# Patient Record
Sex: MALE | Race: OTHER | HISPANIC OR LATINO | URBAN - METROPOLITAN AREA
[De-identification: names, ages, dates, MRNs, and addresses within clinical notes are randomized per-mention and may not be internally consistent; named-entity substitution may affect disease eponyms.]

---

## 2017-05-24 ENCOUNTER — OUTPATIENT (OUTPATIENT)
Dept: OUTPATIENT SERVICES | Facility: HOSPITAL | Age: 37
LOS: 1 days | Discharge: HOME | End: 2017-05-24

## 2017-06-28 DIAGNOSIS — K05.4 PERIODONTOSIS: ICD-10-CM

## 2017-08-17 ENCOUNTER — OUTPATIENT (OUTPATIENT)
Dept: OUTPATIENT SERVICES | Facility: HOSPITAL | Age: 37
LOS: 1 days | Discharge: HOME | End: 2017-08-17

## 2017-08-17 ENCOUNTER — EMERGENCY (EMERGENCY)
Facility: HOSPITAL | Age: 37
LOS: 0 days | Discharge: HOME | End: 2017-08-17

## 2017-08-17 DIAGNOSIS — M79.606 PAIN IN LEG, UNSPECIFIED: ICD-10-CM

## 2017-08-17 DIAGNOSIS — M79.1 MYALGIA: ICD-10-CM

## 2017-08-17 DIAGNOSIS — E11.65 TYPE 2 DIABETES MELLITUS WITH HYPERGLYCEMIA: ICD-10-CM

## 2017-08-23 PROBLEM — Z00.00 ENCOUNTER FOR PREVENTIVE HEALTH EXAMINATION: Status: ACTIVE | Noted: 2017-08-23

## 2017-09-05 ENCOUNTER — OUTPATIENT (OUTPATIENT)
Dept: OUTPATIENT SERVICES | Facility: HOSPITAL | Age: 37
LOS: 1 days | Discharge: HOME | End: 2017-09-05

## 2017-09-14 ENCOUNTER — OUTPATIENT (OUTPATIENT)
Dept: OUTPATIENT SERVICES | Facility: HOSPITAL | Age: 37
LOS: 1 days | Discharge: HOME | End: 2017-09-14

## 2017-09-14 ENCOUNTER — RESULT REVIEW (OUTPATIENT)
Age: 37
End: 2017-09-14

## 2017-09-14 ENCOUNTER — APPOINTMENT (OUTPATIENT)
Dept: ENDOCRINOLOGY | Facility: CLINIC | Age: 37
End: 2017-09-14

## 2017-09-14 VITALS
BODY MASS INDEX: 23.56 KG/M2 | TEMPERATURE: 96.7 F | DIASTOLIC BLOOD PRESSURE: 80 MMHG | SYSTOLIC BLOOD PRESSURE: 115 MMHG | HEART RATE: 81 BPM | HEIGHT: 60 IN | WEIGHT: 120 LBS

## 2017-09-14 DIAGNOSIS — Z83.3 FAMILY HISTORY OF DIABETES MELLITUS: ICD-10-CM

## 2017-09-17 LAB — GLUCOSE SERPL-MCNC: 237 MG/DL

## 2017-09-17 RX ORDER — METFORMIN HYDROCHLORIDE 500 MG/1
500 TABLET, COATED ORAL
Qty: 180 | Refills: 2 | Status: DISCONTINUED | COMMUNITY
Start: 2017-09-14 | End: 2017-09-17

## 2017-11-03 ENCOUNTER — OUTPATIENT (OUTPATIENT)
Dept: OUTPATIENT SERVICES | Facility: HOSPITAL | Age: 37
LOS: 1 days | Discharge: HOME | End: 2017-11-03

## 2017-11-03 DIAGNOSIS — K02.52 DENTAL CARIES ON PIT AND FISSURE SURFACE PENETRATING INTO DENTIN: ICD-10-CM

## 2018-01-05 ENCOUNTER — OUTPATIENT (OUTPATIENT)
Dept: OUTPATIENT SERVICES | Facility: HOSPITAL | Age: 38
LOS: 1 days | Discharge: HOME | End: 2018-01-05

## 2018-01-05 DIAGNOSIS — K02.52 DENTAL CARIES ON PIT AND FISSURE SURFACE PENETRATING INTO DENTIN: ICD-10-CM

## 2018-01-25 ENCOUNTER — OUTPATIENT (OUTPATIENT)
Dept: OUTPATIENT SERVICES | Facility: HOSPITAL | Age: 38
LOS: 1 days | Discharge: HOME | End: 2018-01-25

## 2018-01-25 DIAGNOSIS — K02.52 DENTAL CARIES ON PIT AND FISSURE SURFACE PENETRATING INTO DENTIN: ICD-10-CM

## 2018-02-08 ENCOUNTER — OUTPATIENT (OUTPATIENT)
Dept: OUTPATIENT SERVICES | Facility: HOSPITAL | Age: 38
LOS: 1 days | Discharge: HOME | End: 2018-02-08

## 2018-02-09 DIAGNOSIS — K02.52 DENTAL CARIES ON PIT AND FISSURE SURFACE PENETRATING INTO DENTIN: ICD-10-CM

## 2018-02-22 ENCOUNTER — OUTPATIENT (OUTPATIENT)
Dept: OUTPATIENT SERVICES | Facility: HOSPITAL | Age: 38
LOS: 1 days | Discharge: HOME | End: 2018-02-22

## 2018-04-28 ENCOUNTER — EMERGENCY (EMERGENCY)
Facility: HOSPITAL | Age: 38
LOS: 0 days | Discharge: HOME | End: 2018-04-28
Attending: EMERGENCY MEDICINE | Admitting: EMERGENCY MEDICINE

## 2018-04-28 VITALS
HEART RATE: 90 BPM | DIASTOLIC BLOOD PRESSURE: 62 MMHG | TEMPERATURE: 99 F | RESPIRATION RATE: 18 BRPM | SYSTOLIC BLOOD PRESSURE: 111 MMHG

## 2018-04-28 DIAGNOSIS — K04.7 PERIAPICAL ABSCESS WITHOUT SINUS: ICD-10-CM

## 2018-04-28 DIAGNOSIS — R22.0 LOCALIZED SWELLING, MASS AND LUMP, HEAD: ICD-10-CM

## 2018-04-28 DIAGNOSIS — K08.89 OTHER SPECIFIED DISORDERS OF TEETH AND SUPPORTING STRUCTURES: ICD-10-CM

## 2018-04-28 RX ORDER — IBUPROFEN 200 MG
600 TABLET ORAL ONCE
Qty: 0 | Refills: 0 | Status: DISCONTINUED | OUTPATIENT
Start: 2018-04-28 | End: 2018-04-28

## 2018-04-28 RX ORDER — PENICILLIN V POTASSIUM 250 MG
500 TABLET ORAL ONCE
Qty: 0 | Refills: 0 | Status: DISCONTINUED | OUTPATIENT
Start: 2018-04-28 | End: 2018-04-28

## 2018-04-28 NOTE — ED PROVIDER NOTE - PROGRESS NOTE DETAILS
Case discussed with Dr Michael from dental will see patient patient seen by dental started on clindamycin and follow up as per appointment on Tuesday.  PT INSTRUCTED TO RETURN TO THE ED if swelling becomes worse of new sx develop including fever for reeval by dental and possible drainage of abscess for which dental sts too small for drainage at this time

## 2018-04-28 NOTE — ED PROVIDER NOTE - MEDICAL DECISION MAKING DETAILS
patient with swelling to left side of face seen by dental has an appointment with dental clinic on Tuesday, close follow up discussed

## 2018-04-28 NOTE — ED ADULT TRIAGE NOTE - CHIEF COMPLAINT QUOTE
left sided facial swelling x 3 days. Pt taking Advil with no relief. Pt denies any difficulty swallowing, speech clear, bl hand  equal and firm

## 2018-04-28 NOTE — ED PROVIDER NOTE - NS ED ROS FT
Review of Systems  Constitutional: (-) fever  Eyes/ENT: (-) blurry vision  Cardiovascular: (-) chest pain  Respiratory: (-) cough  Gastrointestinal: (-) vomiting, (-) diarrhea  Musculoskeletal: (-) neck pain  Integumentary: +facial redness  Neurological: (-) headache, (-) altered mental status

## 2018-04-28 NOTE — ED PROVIDER NOTE - PHYSICAL EXAMINATION
Gen: Alert, NAD, well appearing  Head: NC, AT, PERRL, EOMI  ENT: normal hearing, +poor dentition with small absecss between gingival line and buccal mucosa with tenderness to area. +mild facial swelling on left no crepitus  Neck: +supple, no tenderness, +submandibular LAD on left  Pulm: Bilateral BS, normal resp effort, no wheeze/stridor/retractions  CV: RRR, no M/R/G  Abd: soft, NT/ND  Mskel: no edema  Neuro: AAOx3

## 2018-04-28 NOTE — ED PROVIDER NOTE - ATTENDING CONTRIBUTION TO CARE
Left facial swelling. Pt has been having root canal done recently at the dental clinic. Work incomplete. Now pain and swelling. Consulted dental advised only clinda and follow up this week for dental eval.. ON exam no evidence of ludwigs. mild left facial swelling. + gingival swelling.

## 2018-04-28 NOTE — ED PROVIDER NOTE - OBJECTIVE STATEMENT
Patient pmhx DM presents to the ED for evaluation of facial swelling to left side.  As per patient was in dental clinic on Tuesday for root canal and noted swelling to left side of face today, able to swallow no fever no trismus, minimally tender no pain with eye movement.

## 2018-05-30 ENCOUNTER — APPOINTMENT (OUTPATIENT)
Dept: ENDOCRINOLOGY | Facility: CLINIC | Age: 38
End: 2018-05-30

## 2018-05-30 ENCOUNTER — LABORATORY RESULT (OUTPATIENT)
Age: 38
End: 2018-05-30

## 2018-05-30 ENCOUNTER — OUTPATIENT (OUTPATIENT)
Dept: OUTPATIENT SERVICES | Facility: HOSPITAL | Age: 38
LOS: 1 days | Discharge: HOME | End: 2018-05-30

## 2018-05-30 VITALS — SYSTOLIC BLOOD PRESSURE: 138 MMHG | DIASTOLIC BLOOD PRESSURE: 85 MMHG | HEART RATE: 87 BPM

## 2018-05-30 VITALS — WEIGHT: 114 LBS | BODY MASS INDEX: 339.97 KG/M2

## 2018-05-30 LAB — GLUCOSE BLDC GLUCOMTR-MCNC: 333

## 2018-05-30 RX ORDER — METFORMIN HYDROCHLORIDE 1000 MG/1
1000 TABLET, COATED ORAL TWICE DAILY
Qty: 180 | Refills: 3 | Status: ACTIVE | COMMUNITY
Start: 2017-09-14 | End: 1900-01-01

## 2018-05-31 ENCOUNTER — OTHER (OUTPATIENT)
Age: 38
End: 2018-05-31

## 2018-05-31 LAB
ALBUMIN SERPL ELPH-MCNC: 4.7 G/DL
ALP BLD-CCNC: 142 U/L
ALT SERPL-CCNC: 30 U/L
ANION GAP SERPL CALC-SCNC: 15 MMOL/L
AST SERPL-CCNC: 26 U/L
BILIRUB SERPL-MCNC: 0.6 MG/DL
BUN SERPL-MCNC: 17 MG/DL
CALCIUM SERPL-MCNC: 9.3 MG/DL
CHLORIDE SERPL-SCNC: 98 MMOL/L
CHOLEST SERPL-MCNC: 235 MG/DL
CHOLEST/HDLC SERPL: 3.2 RATIO
CO2 SERPL-SCNC: 27 MMOL/L
CREAT SERPL-MCNC: 0.8 MG/DL
ESTIMATED AVERAGE GLUCOSE: 398 MG/DL
GLUCOSE SERPL-MCNC: 337 MG/DL
HBA1C MFR BLD HPLC: 15.5 %
HDLC SERPL-MCNC: 74 MG/DL
LDLC SERPL CALC-MCNC: 160 MG/DL
POTASSIUM SERPL-SCNC: 4.7 MMOL/L
PROT SERPL-MCNC: 7.2 G/DL
SODIUM SERPL-SCNC: 140 MMOL/L
TRIGL SERPL-MCNC: 97 MG/DL

## 2018-06-01 LAB
CREAT SPEC-SCNC: 32 MG/DL
MICROALBUMIN 24H UR DL<=1MG/L-MCNC: 14.2 MG/DL
MICROALBUMIN/CREAT 24H UR-RTO: 444 MG/G

## 2018-08-02 ENCOUNTER — OUTPATIENT (OUTPATIENT)
Dept: OUTPATIENT SERVICES | Facility: HOSPITAL | Age: 38
LOS: 1 days | Discharge: HOME | End: 2018-08-02

## 2018-10-04 ENCOUNTER — OUTPATIENT (OUTPATIENT)
Facility: HOSPITAL | Age: 38
LOS: 1 days | Discharge: HOME | End: 2018-10-04

## 2018-10-04 DIAGNOSIS — K02.53 DENTAL CARIES ON PIT AND FISSURE SURFACE PENETRATING INTO PULP: ICD-10-CM

## 2018-10-18 ENCOUNTER — OUTPATIENT (OUTPATIENT)
Dept: OUTPATIENT SERVICES | Facility: HOSPITAL | Age: 38
LOS: 1 days | Discharge: HOME | End: 2018-10-18

## 2018-10-18 DIAGNOSIS — K02.53 DENTAL CARIES ON PIT AND FISSURE SURFACE PENETRATING INTO PULP: ICD-10-CM

## 2018-12-06 ENCOUNTER — OUTPATIENT (OUTPATIENT)
Dept: OUTPATIENT SERVICES | Facility: HOSPITAL | Age: 38
LOS: 1 days | Discharge: HOME | End: 2018-12-06

## 2018-12-10 ENCOUNTER — EMERGENCY (EMERGENCY)
Facility: HOSPITAL | Age: 38
LOS: 1 days | Discharge: HOME | End: 2018-12-10
Attending: EMERGENCY MEDICINE

## 2018-12-10 VITALS
OXYGEN SATURATION: 99 % | HEART RATE: 82 BPM | SYSTOLIC BLOOD PRESSURE: 100 MMHG | WEIGHT: 169.98 LBS | TEMPERATURE: 97 F | HEIGHT: 67 IN | DIASTOLIC BLOOD PRESSURE: 68 MMHG | RESPIRATION RATE: 18 BRPM

## 2018-12-10 VITALS
SYSTOLIC BLOOD PRESSURE: 87 MMHG | OXYGEN SATURATION: 99 % | HEART RATE: 89 BPM | DIASTOLIC BLOOD PRESSURE: 53 MMHG | RESPIRATION RATE: 17 BRPM | TEMPERATURE: 97 F

## 2018-12-10 DIAGNOSIS — Z79.2 LONG TERM (CURRENT) USE OF ANTIBIOTICS: ICD-10-CM

## 2018-12-10 DIAGNOSIS — M79.10 MYALGIA, UNSPECIFIED SITE: ICD-10-CM

## 2018-12-10 DIAGNOSIS — J06.9 ACUTE UPPER RESPIRATORY INFECTION, UNSPECIFIED: ICD-10-CM

## 2018-12-10 DIAGNOSIS — E11.9 TYPE 2 DIABETES MELLITUS WITHOUT COMPLICATIONS: ICD-10-CM

## 2018-12-10 DIAGNOSIS — J02.9 ACUTE PHARYNGITIS, UNSPECIFIED: ICD-10-CM

## 2018-12-10 RX ORDER — DEXAMETHASONE 0.5 MG/5ML
10 ELIXIR ORAL ONCE
Qty: 0 | Refills: 0 | Status: DISCONTINUED | OUTPATIENT
Start: 2018-12-10 | End: 2018-12-10

## 2018-12-10 RX ORDER — IBUPROFEN 200 MG
2 TABLET ORAL
Qty: 84 | Refills: 0 | OUTPATIENT
Start: 2018-12-10 | End: 2018-12-23

## 2018-12-10 NOTE — ED PROVIDER NOTE - ATTENDING CONTRIBUTION TO CARE
Pt is a 39yo male with sore throat and mylagias for 3days.  No fever.    Exam: normal pharynx, no LAD, CTAB, NAD  Imp: viral URI  Plan: [place] home

## 2018-12-10 NOTE — ED PROVIDER NOTE - OBJECTIVE STATEMENT
38M with pmh of DM presents with sore throat for the past 3 days. Denies fever, chills, confirms body aches and congestion. Denies sick contacts. No n/v/d, SOB or CP. Denies cough.

## 2018-12-10 NOTE — ED PROVIDER NOTE - NSFOLLOWUPINSTRUCTIONS_ED_ALL_ED_FT
Pharyngitis    Pharyngitis is inflammation of your pharynx, which is typically caused by a viral or bacterial infection. Pharyngitis can be contagious and may spread from person to person through intimate contact, coughing, sneezing, or sharing personal items and utensils. Symptoms of pharyngitis may include sore throat, fever, headache, or swollen lymph nodes. If you are prescribed antibiotics, make sure you finish them even if you start to feel better. Gargle with salt water as often as every 1-2 hours to soothe your throat. Throat lozenges (if you are not at risk for choking) or sprays may be used to soothe your throat.    SEEK IMMEDIATE MEDICAL CARE IF YOU HAVE ANY OF THE FOLLOWING SYMPTOMS: neck stiffness, drooling, hoarseness or change in voice, inability to swallow liquids, vomiting, or trouble breathing.

## 2018-12-10 NOTE — ED PROVIDER NOTE - NS ED ROS FT
Eyes:  No visual changes, eye pain or discharge.  ENMT:  No hearing changes, pain, + sore throat - runny nose, no difficulty swallowing  Cardiac:  No chest pain, SOB or edema. No chest pain with exertion.  Respiratory:  No cough or respiratory distress. No hemoptysis. No history of asthma or RAD.  GI:  No nausea, vomiting, diarrhea or abdominal pain.  :  No dysuria, frequency or burning.  MS:  No myalgia, muscle weakness, joint pain or back pain.  Neuro:  No headache or weakness.  No LOC.  Skin:  No skin rash.   Endocrine: No history of thyroid disease + diabetes.

## 2018-12-10 NOTE — ED PROVIDER NOTE - PHYSICAL EXAMINATION
CONSTITUTIONAL: Well-developed; well-nourished; in no acute distress.   SKIN: warm, dry  HEAD: Normocephalic; atraumatic.  EYES: PERRL, EOMI, no conjunctival erythema  ENT: No nasal discharge; airway clear. +pharyngeal erythema, no exudates  NECK: Supple; non tender. No lymphadenopathy.  CARD: S1, S2 normal; no murmurs, gallops, or rubs. Regular rate and rhythm.   RESP: No wheezes, rales or rhonchi.  ABD: soft ntnd  EXT: Normal ROM.  No clubbing, cyanosis or edema.   LYMPH: No acute cervical adenopathy.  NEURO: Alert, oriented, grossly unremarkable  PSYCH: Cooperative, appropriate.

## 2018-12-15 ENCOUNTER — EMERGENCY (EMERGENCY)
Facility: HOSPITAL | Age: 38
LOS: 0 days | Discharge: HOME | End: 2018-12-15
Admitting: EMERGENCY MEDICAL TECHNICIAN, BASIC

## 2018-12-15 VITALS
DIASTOLIC BLOOD PRESSURE: 65 MMHG | OXYGEN SATURATION: 99 % | HEART RATE: 94 BPM | RESPIRATION RATE: 18 BRPM | SYSTOLIC BLOOD PRESSURE: 102 MMHG | TEMPERATURE: 96 F

## 2018-12-15 DIAGNOSIS — H66.91 OTITIS MEDIA, UNSPECIFIED, RIGHT EAR: ICD-10-CM

## 2018-12-15 DIAGNOSIS — H92.01 OTALGIA, RIGHT EAR: ICD-10-CM

## 2018-12-15 PROBLEM — E11.9 TYPE 2 DIABETES MELLITUS WITHOUT COMPLICATIONS: Chronic | Status: ACTIVE | Noted: 2018-12-10

## 2018-12-15 LAB
BASE EXCESS BLDV CALC-SCNC: -1.5 MMOL/L — SIGNIFICANT CHANGE UP (ref -2–2)
CA-I SERPL-SCNC: 1.22 MMOL/L — SIGNIFICANT CHANGE UP (ref 1.12–1.3)
GAS PNL BLDV: 135 MMOL/L — LOW (ref 136–145)
GAS PNL BLDV: SIGNIFICANT CHANGE UP
GAS PNL BLDV: SIGNIFICANT CHANGE UP
HCO3 BLDV-SCNC: 25 MMOL/L — SIGNIFICANT CHANGE UP (ref 22–29)
HCT VFR BLDA CALC: 54 % — HIGH (ref 34–44)
HGB BLD CALC-MCNC: 18 G/DL — SIGNIFICANT CHANGE UP (ref 14–18)
LACTATE BLDV-MCNC: 1.2 MMOL/L — SIGNIFICANT CHANGE UP (ref 0.5–1.6)
PCO2 BLDV: 49 MMHG — SIGNIFICANT CHANGE UP (ref 41–51)
PH BLDV: 7.32 — SIGNIFICANT CHANGE UP (ref 7.26–7.43)
PO2 BLDV: 22 MMHG — SIGNIFICANT CHANGE UP (ref 20–40)
POTASSIUM BLDV-SCNC: 4.5 MMOL/L — SIGNIFICANT CHANGE UP (ref 3.3–5.6)
SAO2 % BLDV: 35 % — SIGNIFICANT CHANGE UP

## 2018-12-15 RX ORDER — SODIUM CHLORIDE 9 MG/ML
2000 INJECTION INTRAMUSCULAR; INTRAVENOUS; SUBCUTANEOUS ONCE
Qty: 0 | Refills: 0 | Status: COMPLETED | OUTPATIENT
Start: 2018-12-15 | End: 2018-12-15

## 2018-12-15 RX ORDER — KETOROLAC TROMETHAMINE 30 MG/ML
30 SYRINGE (ML) INJECTION ONCE
Qty: 0 | Refills: 0 | Status: DISCONTINUED | OUTPATIENT
Start: 2018-12-15 | End: 2018-12-15

## 2018-12-15 RX ADMIN — Medication 30 MILLIGRAM(S): at 17:35

## 2018-12-15 RX ADMIN — SODIUM CHLORIDE 1000 MILLILITER(S): 9 INJECTION INTRAMUSCULAR; INTRAVENOUS; SUBCUTANEOUS at 17:34

## 2018-12-15 NOTE — ED PROVIDER NOTE - CARE PROVIDER_API CALL
Marlene Roland), Otolaryngology  [address] [place] [address]  Phone: [phone]  Fax: [phone]

## 2018-12-15 NOTE — ED PROVIDER NOTE - MEDICAL DECISION MAKING DETAILS
pt feel smuch better after fluids and toradol, will d/c home with script for augmentin for otitis media pt feel smuch better after fluids and toradol, will d/c home with script for augmentin for otitis media, [place]  used #[id number]

## 2018-12-15 NOTE — ED PROVIDER NOTE - OBJECTIVE STATEMENT
Pt is a 37y/o male with a pmhx of DM Presents today for eval of right ear pain, sore throat and dry cough x 6 days. Pt sts cough and sore throat have nearly resolved, but ear pain has gotten worse today. Pt denies fever, chills, weakness, numbness, n/v/d, CP, SOB.

## 2018-12-15 NOTE — ED PROVIDER NOTE - NS ED ROS FT
ENMT: + sore throat  + ear pain, No hearing changes, pain, discharge or infections. No neck pain or stiffness.  Cardiac:  No chest pain, SOB or edema. No chest pain with exertion.  Respiratory:  + cough No respiratory distress. No hemoptysis. No history of asthma or RAD.  GI:  No nausea, vomiting, diarrhea or abdominal pain.  MS:  No myalgia, muscle weakness, joint pain or back pain.  Neuro:  No headache or weakness.  No LOC.  Skin:  No skin rash.   Endocrine: + diabetes.  Except as documented in the HPI,  all other systems are negative.

## 2018-12-15 NOTE — ED PROVIDER NOTE - NSFOLLOWUPCLINICS_GEN_ALL_ED_FT
[facility]  ENT  [address], [address]  [place] [address]  Phone: [phone]  Fax:   Follow Up Time:

## 2018-12-15 NOTE — ED PROVIDER NOTE - PHYSICAL EXAMINATION
VITAL SIGNS: I have reviewed nursing notes and confirm.  CONSTITUTIONAL: Well-developed; well-nourished; in no acute distress.   SKIN:  skin exam is warm and dry, no acute rash.    HEAD: Normocephalic; atraumatic.  EYES: conjunctiva and sclera clear.  ENT: uvula midline, no exudates, no sublingual edema, erythema to right ear canal, mild effusion, no pain to ear with manipulation of pinna, no mastoid tenderness, No nasal discharge; airway clear.  CARD: S1, S2 normal; no murmurs, gallops, or rubs. Regular rate and rhythm.   RESP: No wheezes, rales or rhonchi.  ABD: Normal bowel sounds; soft; non-distended; non-tender  EXT: Normal ROM.  No clubbing, cyanosis or edema.   NEURO: Alert, oriented, grossly unremarkable

## 2018-12-25 ENCOUNTER — EMERGENCY (EMERGENCY)
Facility: HOSPITAL | Age: 38
LOS: 0 days | Discharge: HOME | End: 2018-12-25
Admitting: PHYSICIAN ASSISTANT

## 2018-12-25 VITALS
DIASTOLIC BLOOD PRESSURE: 65 MMHG | SYSTOLIC BLOOD PRESSURE: 109 MMHG | OXYGEN SATURATION: 100 % | HEART RATE: 94 BPM | TEMPERATURE: 96 F | RESPIRATION RATE: 18 BRPM

## 2018-12-25 DIAGNOSIS — H92.03 OTALGIA, BILATERAL: ICD-10-CM

## 2018-12-25 DIAGNOSIS — E11.9 TYPE 2 DIABETES MELLITUS WITHOUT COMPLICATIONS: ICD-10-CM

## 2018-12-25 DIAGNOSIS — Z79.2 LONG TERM (CURRENT) USE OF ANTIBIOTICS: ICD-10-CM

## 2018-12-25 DIAGNOSIS — H92.09 OTALGIA, UNSPECIFIED EAR: ICD-10-CM

## 2018-12-25 DIAGNOSIS — Z79.899 OTHER LONG TERM (CURRENT) DRUG THERAPY: ICD-10-CM

## 2018-12-25 NOTE — ED PROVIDER NOTE - OBJECTIVE STATEMENT
38 y.o. male with PMH of diabetes presents to the ED c/o ear pain x few days.  Pt has been seen in the ED for similar complaints and was prescribed Augmentin.  Pt tried to follow up with ENT but was not able to schedule appointment.  Pt denies any fever, chills, n/v/d, drainage, bleeding, discharge, or any other complaints.

## 2018-12-25 NOTE — ED PROVIDER NOTE - PHYSICAL EXAMINATION
CONSTITUTIONAL: Well-developed; well-nourished; in no acute distress, nontoxic appearing  SKIN: skin exam is warm and dry,  HEAD: Normocephalic; atraumatic.  EYES: PERRL, 3 mm bilateral, no nystagmus, EOM intact; conjunctiva and sclera clear.  ENT: MMM, no nasal congestion, no erythema, no TM bulging, no discharge  NECK: Supple; non tender.+ full passive ROM in all directions. No JVD  CARD: S1, S2 normal, no murmur  RESP: No wheezes, rales or rhonchi. Good air movement bilaterally  ABD: soft; non-distended; non-tender. No Rebound, No guarding  EXT: Normal ROM. No cyanosis or edema. Dp Pulses intact.   NEURO: awake, alert, following commands, oriented, grossly unremarkable. No Focal deficits. GCS 15.   PSYCH: Cooperative, appropriate.

## 2018-12-25 NOTE — ED PROVIDER NOTE - NSFOLLOWUPCLINICS_GEN_ALL_ED_FT
[facility]  ENT  [address], [address]  [place] [address]  Phone: [phone]  Fax:   Follow Up Time:

## 2018-12-25 NOTE — ED PROVIDER NOTE - NS ED ROS FT
Constitutional:  no fevers, no chills, no malaise  Eyes:  No visual changes  ENMT: +ear pain; No neck pain or stiffness, no nasal congestion, no throat pain  Cardiac:  No chest pain  Respiratory:  No cough or sob  GI:  No nausea, vomiting, diarrhea or abdominal pain.  :  No dysuria, frequency or burning.  MS:  No back pain, no joint pain.  Neuro:  No headache, no dizziness, no change in mental status  Skin:  No skin rash  Except as documented in the HPI,  all other systems are negative

## 2018-12-25 NOTE — ED PROVIDER NOTE - CARE PROVIDER_API CALL
Ambrosio Yates), Otolaryngology  [address] [place] [address]  Phone: [phone]  Fax: [phone]

## 2019-01-02 ENCOUNTER — APPOINTMENT (OUTPATIENT)
Dept: OTOLARYNGOLOGY | Facility: CLINIC | Age: 39
End: 2019-01-02
Payer: MEDICAID

## 2019-01-02 VITALS — BODY MASS INDEX: 18.19 KG/M2 | HEIGHT: 68 IN | WEIGHT: 120 LBS

## 2019-01-02 DIAGNOSIS — E11.8 TYPE 2 DIABETES MELLITUS WITH UNSPECIFIED COMPLICATIONS: ICD-10-CM

## 2019-01-02 DIAGNOSIS — H92.01 OTALGIA, RIGHT EAR: ICD-10-CM

## 2019-01-02 DIAGNOSIS — H90.5 UNSPECIFIED SENSORINEURAL HEARING LOSS: ICD-10-CM

## 2019-01-02 DIAGNOSIS — F17.200 NICOTINE DEPENDENCE, UNSPECIFIED, UNCOMPLICATED: ICD-10-CM

## 2019-01-02 DIAGNOSIS — Z87.09 PERSONAL HISTORY OF OTHER DISEASES OF THE RESPIRATORY SYSTEM: ICD-10-CM

## 2019-01-02 PROCEDURE — 99204 OFFICE O/P NEW MOD 45 MIN: CPT | Mod: 25

## 2019-01-02 PROCEDURE — 92550 TYMPANOMETRY & REFLEX THRESH: CPT

## 2019-01-02 PROCEDURE — 92557 COMPREHENSIVE HEARING TEST: CPT

## 2019-01-02 PROCEDURE — 31575 DIAGNOSTIC LARYNGOSCOPY: CPT

## 2019-01-03 ENCOUNTER — OUTPATIENT (OUTPATIENT)
Dept: OUTPATIENT SERVICES | Facility: HOSPITAL | Age: 39
LOS: 1 days | Discharge: HOME | End: 2019-01-03

## 2019-01-03 DIAGNOSIS — K02.53 DENTAL CARIES ON PIT AND FISSURE SURFACE PENETRATING INTO PULP: ICD-10-CM

## 2019-01-08 ENCOUNTER — INPATIENT (INPATIENT)
Facility: HOSPITAL | Age: 39
LOS: 1 days | Discharge: HOME | End: 2019-01-10
Attending: INTERNAL MEDICINE | Admitting: INTERNAL MEDICINE

## 2019-01-08 VITALS
DIASTOLIC BLOOD PRESSURE: 70 MMHG | SYSTOLIC BLOOD PRESSURE: 107 MMHG | OXYGEN SATURATION: 100 % | HEART RATE: 88 BPM | RESPIRATION RATE: 18 BRPM | TEMPERATURE: 96 F

## 2019-01-08 LAB
ALBUMIN SERPL ELPH-MCNC: 3.7 G/DL — SIGNIFICANT CHANGE UP (ref 3.5–5.2)
ALP SERPL-CCNC: 93 U/L — SIGNIFICANT CHANGE UP (ref 30–115)
ALT FLD-CCNC: 13 U/L — SIGNIFICANT CHANGE UP (ref 0–41)
ANION GAP SERPL CALC-SCNC: 24 MMOL/L — HIGH (ref 7–14)
ANION GAP SERPL CALC-SCNC: 27 MMOL/L — HIGH (ref 7–14)
APPEARANCE UR: CLEAR — SIGNIFICANT CHANGE UP
AST SERPL-CCNC: 8 U/L — SIGNIFICANT CHANGE UP (ref 0–41)
BASE EXCESS BLDV CALC-SCNC: -5.1 MMOL/L — LOW (ref -2–2)
BASOPHILS # BLD AUTO: 0.08 K/UL — SIGNIFICANT CHANGE UP (ref 0–0.2)
BASOPHILS NFR BLD AUTO: 1.3 % — HIGH (ref 0–1)
BILIRUB SERPL-MCNC: 0.4 MG/DL — SIGNIFICANT CHANGE UP (ref 0.2–1.2)
BILIRUB UR-MCNC: NEGATIVE — SIGNIFICANT CHANGE UP
BLD GP AB SCN SERPL QL: SIGNIFICANT CHANGE UP
BUN SERPL-MCNC: 22 MG/DL — HIGH (ref 10–20)
BUN SERPL-MCNC: 29 MG/DL — HIGH (ref 10–20)
CA-I SERPL-SCNC: 1.25 MMOL/L — SIGNIFICANT CHANGE UP (ref 1.12–1.3)
CALCIUM SERPL-MCNC: 8.5 MG/DL — SIGNIFICANT CHANGE UP (ref 8.5–10.1)
CALCIUM SERPL-MCNC: 9.5 MG/DL — SIGNIFICANT CHANGE UP (ref 8.5–10.1)
CHLORIDE SERPL-SCNC: 87 MMOL/L — LOW (ref 98–110)
CHLORIDE SERPL-SCNC: 91 MMOL/L — LOW (ref 98–110)
CO2 SERPL-SCNC: 18 MMOL/L — SIGNIFICANT CHANGE UP (ref 17–32)
CO2 SERPL-SCNC: 20 MMOL/L — SIGNIFICANT CHANGE UP (ref 17–32)
COLOR SPEC: YELLOW — SIGNIFICANT CHANGE UP
CREAT SERPL-MCNC: 0.8 MG/DL — SIGNIFICANT CHANGE UP (ref 0.7–1.5)
CREAT SERPL-MCNC: 1 MG/DL — SIGNIFICANT CHANGE UP (ref 0.7–1.5)
DIFF PNL FLD: NEGATIVE — SIGNIFICANT CHANGE UP
EOSINOPHIL # BLD AUTO: 0.07 K/UL — SIGNIFICANT CHANGE UP (ref 0–0.7)
EOSINOPHIL NFR BLD AUTO: 1.1 % — SIGNIFICANT CHANGE UP (ref 0–8)
GAS PNL BLDV: 134 MMOL/L — LOW (ref 136–145)
GAS PNL BLDV: SIGNIFICANT CHANGE UP
GLUCOSE BLDC GLUCOMTR-MCNC: 280 MG/DL — HIGH (ref 70–99)
GLUCOSE SERPL-MCNC: 341 MG/DL — HIGH (ref 70–99)
GLUCOSE SERPL-MCNC: 407 MG/DL — HIGH (ref 70–99)
GLUCOSE UR QL: >=1000
HCO3 BLDV-SCNC: 20 MMOL/L — LOW (ref 22–29)
HCT VFR BLD CALC: 37.4 % — LOW (ref 42–52)
HCT VFR BLDA CALC: 40.3 % — SIGNIFICANT CHANGE UP (ref 34–44)
HGB BLD CALC-MCNC: 13.1 G/DL — LOW (ref 14–18)
HGB BLD-MCNC: 12.6 G/DL — LOW (ref 14–18)
IMM GRANULOCYTES NFR BLD AUTO: 0.6 % — HIGH (ref 0.1–0.3)
KETONES UR-MCNC: >=80
LACTATE BLDV-MCNC: 1.3 MMOL/L — SIGNIFICANT CHANGE UP (ref 0.5–1.6)
LEUKOCYTE ESTERASE UR-ACNC: NEGATIVE — SIGNIFICANT CHANGE UP
LYMPHOCYTES # BLD AUTO: 1.94 K/UL — SIGNIFICANT CHANGE UP (ref 1.2–3.4)
LYMPHOCYTES # BLD AUTO: 30.6 % — SIGNIFICANT CHANGE UP (ref 20.5–51.1)
MAGNESIUM SERPL-MCNC: 1.6 MG/DL — LOW (ref 1.8–2.4)
MCHC RBC-ENTMCNC: 30.4 PG — SIGNIFICANT CHANGE UP (ref 27–31)
MCHC RBC-ENTMCNC: 33.7 G/DL — SIGNIFICANT CHANGE UP (ref 32–37)
MCV RBC AUTO: 90.1 FL — SIGNIFICANT CHANGE UP (ref 80–94)
MONOCYTES # BLD AUTO: 0.59 K/UL — SIGNIFICANT CHANGE UP (ref 0.1–0.6)
MONOCYTES NFR BLD AUTO: 9.3 % — SIGNIFICANT CHANGE UP (ref 1.7–9.3)
NEUTROPHILS # BLD AUTO: 3.63 K/UL — SIGNIFICANT CHANGE UP (ref 1.4–6.5)
NEUTROPHILS NFR BLD AUTO: 57.1 % — SIGNIFICANT CHANGE UP (ref 42.2–75.2)
NITRITE UR-MCNC: NEGATIVE — SIGNIFICANT CHANGE UP
NRBC # BLD: 0 /100 WBCS — SIGNIFICANT CHANGE UP (ref 0–0)
PCO2 BLDV: 39 MMHG — LOW (ref 41–51)
PH BLDV: 7.33 — SIGNIFICANT CHANGE UP (ref 7.26–7.43)
PH UR: 6 — SIGNIFICANT CHANGE UP (ref 5–8)
PHOSPHATE SERPL-MCNC: 3.6 MG/DL — SIGNIFICANT CHANGE UP (ref 2.1–4.9)
PLATELET # BLD AUTO: 310 K/UL — SIGNIFICANT CHANGE UP (ref 130–400)
PO2 BLDV: 37 MMHG — SIGNIFICANT CHANGE UP (ref 20–40)
POTASSIUM BLDV-SCNC: 5 MMOL/L — SIGNIFICANT CHANGE UP (ref 3.3–5.6)
POTASSIUM SERPL-MCNC: 4.5 MMOL/L — SIGNIFICANT CHANGE UP (ref 3.5–5)
POTASSIUM SERPL-MCNC: 5.5 MMOL/L — HIGH (ref 3.5–5)
POTASSIUM SERPL-SCNC: 4.5 MMOL/L — SIGNIFICANT CHANGE UP (ref 3.5–5)
POTASSIUM SERPL-SCNC: 5.5 MMOL/L — HIGH (ref 3.5–5)
PROT SERPL-MCNC: 6.4 G/DL — SIGNIFICANT CHANGE UP (ref 6–8)
PROT UR-MCNC: NEGATIVE — SIGNIFICANT CHANGE UP
RBC # BLD: 4.15 M/UL — LOW (ref 4.7–6.1)
RBC # FLD: 11.9 % — SIGNIFICANT CHANGE UP (ref 11.5–14.5)
SAO2 % BLDV: 65 % — SIGNIFICANT CHANGE UP
SODIUM SERPL-SCNC: 133 MMOL/L — LOW (ref 135–146)
SODIUM SERPL-SCNC: 134 MMOL/L — LOW (ref 135–146)
SP GR SPEC: >=1.03 — SIGNIFICANT CHANGE UP (ref 1.01–1.03)
TROPONIN T SERPL-MCNC: <0.01 NG/ML — SIGNIFICANT CHANGE UP
TYPE + AB SCN PNL BLD: SIGNIFICANT CHANGE UP
UROBILINOGEN FLD QL: 0.2 — SIGNIFICANT CHANGE UP (ref 0.2–0.2)
WBC # BLD: 6.35 K/UL — SIGNIFICANT CHANGE UP (ref 4.8–10.8)
WBC # FLD AUTO: 6.35 K/UL — SIGNIFICANT CHANGE UP (ref 4.8–10.8)

## 2019-01-08 RX ORDER — SODIUM CHLORIDE 9 MG/ML
1000 INJECTION, SOLUTION INTRAVENOUS ONCE
Qty: 0 | Refills: 0 | Status: DISCONTINUED | OUTPATIENT
Start: 2019-01-08 | End: 2019-01-08

## 2019-01-08 RX ORDER — SODIUM CHLORIDE 9 MG/ML
2000 INJECTION, SOLUTION INTRAVENOUS ONCE
Qty: 0 | Refills: 0 | Status: COMPLETED | OUTPATIENT
Start: 2019-01-08 | End: 2019-01-08

## 2019-01-08 RX ORDER — SODIUM CHLORIDE 9 MG/ML
1000 INJECTION, SOLUTION INTRAVENOUS
Qty: 0 | Refills: 0 | Status: DISCONTINUED | OUTPATIENT
Start: 2019-01-08 | End: 2019-01-09

## 2019-01-08 RX ORDER — INSULIN HUMAN 100 [IU]/ML
5 INJECTION, SOLUTION SUBCUTANEOUS
Qty: 100 | Refills: 0 | Status: DISCONTINUED | OUTPATIENT
Start: 2019-01-08 | End: 2019-01-09

## 2019-01-08 RX ORDER — SODIUM CHLORIDE 9 MG/ML
1000 INJECTION, SOLUTION INTRAVENOUS
Qty: 0 | Refills: 0 | Status: DISCONTINUED | OUTPATIENT
Start: 2019-01-08 | End: 2019-01-08

## 2019-01-08 RX ORDER — SODIUM CHLORIDE 9 MG/ML
1000 INJECTION INTRAMUSCULAR; INTRAVENOUS; SUBCUTANEOUS ONCE
Qty: 0 | Refills: 0 | Status: COMPLETED | OUTPATIENT
Start: 2019-01-08 | End: 2019-01-08

## 2019-01-08 RX ORDER — INSULIN HUMAN 100 [IU]/ML
8 INJECTION, SOLUTION SUBCUTANEOUS ONCE
Qty: 0 | Refills: 0 | Status: DISCONTINUED | OUTPATIENT
Start: 2019-01-08 | End: 2019-01-08

## 2019-01-08 RX ORDER — METOCLOPRAMIDE HCL 10 MG
10 TABLET ORAL ONCE
Qty: 0 | Refills: 0 | Status: COMPLETED | OUTPATIENT
Start: 2019-01-08 | End: 2019-01-08

## 2019-01-08 RX ADMIN — SODIUM CHLORIDE 200 MILLILITER(S): 9 INJECTION, SOLUTION INTRAVENOUS at 21:15

## 2019-01-08 RX ADMIN — INSULIN HUMAN 5 UNIT(S)/HR: 100 INJECTION, SOLUTION SUBCUTANEOUS at 21:15

## 2019-01-08 RX ADMIN — SODIUM CHLORIDE 1000 MILLILITER(S): 9 INJECTION INTRAMUSCULAR; INTRAVENOUS; SUBCUTANEOUS at 21:32

## 2019-01-08 RX ADMIN — SODIUM CHLORIDE 2000 MILLILITER(S): 9 INJECTION, SOLUTION INTRAVENOUS at 15:50

## 2019-01-08 RX ADMIN — SODIUM CHLORIDE 2000 MILLILITER(S): 9 INJECTION INTRAMUSCULAR; INTRAVENOUS; SUBCUTANEOUS at 17:13

## 2019-01-08 RX ADMIN — Medication 10 MILLIGRAM(S): at 16:00

## 2019-01-08 NOTE — ED PROVIDER NOTE - ATTENDING CONTRIBUTION TO CARE
39 yo m with pmh of DM presents with 3 days of dizziness, lightheadedness.  no cp, no sob, no abd pain, no back pain, no fevers, no chills.  no headache, no neck pain.  no trauma.  pt was seen buy ENT recently and given abx for ear infection.  pt feels lightheaded.    Physician who speaks [place] spoke to pt to translate.  awake, alert.  eomi.  motor/sensation intact.  abd soft, nontender.  Lungs clear.    p:  labs, ct, ivf, ekg, cxr, reassess.

## 2019-01-08 NOTE — ED PROVIDER NOTE - MEDICAL DECISION MAKING DETAILS
pt with DM here with malaise, lightheadedness, not feeling well.  no cp, no sob.  pt on abx for ENT infection.  labs with elevated glucose, anion gap.  NOT acidotic.  pt given ivf and labs repeated.  pt continues to have ag (which improved with ivF).  Bicarb 18.  Pt accepted to ICU for insulin drip, furtehr hydration and management of mild dka.  Pt is NOT acidotic.

## 2019-01-08 NOTE — ED PROVIDER NOTE - PROGRESS NOTE DETAILS
pt reevaluated, symptoms resolved. pending repeat bmp pt contines to feel well, however sugar elevated, given lab abnormalities, will treat for dka, admitted to the unit, case josselyn hutchinson, and icu resident viji

## 2019-01-08 NOTE — ED PROVIDER NOTE - OBJECTIVE STATEMENT
39yo m pmhx dm on metformin, dx with ear and throat infection recently, prescribed prednisone and clindamycin by ent for which he has been complaint presents with 3 days of intermittent dizziness, described as feeling like the room is spinning. pt states that on the first day he had one episode of nausea, vomiting, diarrhea, all non bloody. pt has been tolerating po since then. pt reports that he still has a slight sore throat but that it has been improving since starting prescribed medication. no changes in vision or hearing. no headache.

## 2019-01-08 NOTE — ED PROVIDER NOTE - PHYSICAL EXAMINATION
CONSTITUTIONAL: Well-developed; well-nourished; in no acute distress, speaking in full sentences, well appearing,  SKIN: warm, dry  HEAD: Normocephalic; atraumatic  EYES: PERRL, EOMI, no conjunctival erythema  ENT: No nasal discharge; airway clear, mucous membranes moist  NECK: Supple; non tender, FROM  CARD: +S1, S2 no murmurs, gallops, or rubs. Regular rate and rhythm. radial 2+  RESP: No wheezes, rales or rhonchi. CTABL  ABD: soft ntnd, no rebound, no guarding, no rigidity  EXT: moves all extremities, ambulates wo assistance, gait steady,  No clubbing, cyanosis or edema.   NEURO: Alert, oriented, grossly unremarkable, no focal deficits, cn ii-xii grossly intact, gait steady, speech clear, finger to nose testing unremarkable  PSYCH: Cooperative, appropriate

## 2019-01-08 NOTE — ED PROVIDER NOTE - NS ED ROS FT
General: No fevers, chills,   Eyes:  No visual changes, eye pain or discharge.  ENMT:  No hearing changes  Cardiac:  No chest pain, SOB or edema.  Respiratory:  No cough or respiratory distress.   GI:  No abdominal pain.  :  No dysuria, frequency or burning.  MS:  No back pain.  Neuro:  No headache  No LOC.  Skin:  No skin rash.   Endocrine: + diabetes.

## 2019-01-09 LAB
ALBUMIN SERPL ELPH-MCNC: 2.7 G/DL — LOW (ref 3.5–5.2)
ALP SERPL-CCNC: 80 U/L — SIGNIFICANT CHANGE UP (ref 30–115)
ALT FLD-CCNC: 9 U/L — SIGNIFICANT CHANGE UP (ref 0–41)
ANION GAP SERPL CALC-SCNC: 10 MMOL/L — SIGNIFICANT CHANGE UP (ref 7–14)
ANION GAP SERPL CALC-SCNC: 12 MMOL/L — SIGNIFICANT CHANGE UP (ref 7–14)
ANION GAP SERPL CALC-SCNC: 18 MMOL/L — HIGH (ref 7–14)
APTT BLD: 29.8 SEC — SIGNIFICANT CHANGE UP (ref 27–39.2)
AST SERPL-CCNC: 8 U/L — SIGNIFICANT CHANGE UP (ref 0–41)
BASOPHILS # BLD AUTO: 0.05 K/UL — SIGNIFICANT CHANGE UP (ref 0–0.2)
BASOPHILS NFR BLD AUTO: 1.1 % — HIGH (ref 0–1)
BILIRUB SERPL-MCNC: 0.2 MG/DL — SIGNIFICANT CHANGE UP (ref 0.2–1.2)
BUN SERPL-MCNC: 17 MG/DL — SIGNIFICANT CHANGE UP (ref 10–20)
BUN SERPL-MCNC: 17 MG/DL — SIGNIFICANT CHANGE UP (ref 10–20)
BUN SERPL-MCNC: 18 MG/DL — SIGNIFICANT CHANGE UP (ref 10–20)
CALCIUM SERPL-MCNC: 7.9 MG/DL — LOW (ref 8.5–10.1)
CALCIUM SERPL-MCNC: 8.3 MG/DL — LOW (ref 8.5–10.1)
CALCIUM SERPL-MCNC: 8.5 MG/DL — SIGNIFICANT CHANGE UP (ref 8.5–10.1)
CHLORIDE SERPL-SCNC: 101 MMOL/L — SIGNIFICANT CHANGE UP (ref 98–110)
CHLORIDE SERPL-SCNC: 97 MMOL/L — LOW (ref 98–110)
CHLORIDE SERPL-SCNC: 98 MMOL/L — SIGNIFICANT CHANGE UP (ref 98–110)
CK MB CFR SERPL CALC: <1 NG/ML — SIGNIFICANT CHANGE UP (ref 0.6–6.3)
CO2 SERPL-SCNC: 21 MMOL/L — SIGNIFICANT CHANGE UP (ref 17–32)
CO2 SERPL-SCNC: 25 MMOL/L — SIGNIFICANT CHANGE UP (ref 17–32)
CO2 SERPL-SCNC: 26 MMOL/L — SIGNIFICANT CHANGE UP (ref 17–32)
CREAT SERPL-MCNC: 0.6 MG/DL — LOW (ref 0.7–1.5)
CREAT SERPL-MCNC: 0.9 MG/DL — SIGNIFICANT CHANGE UP (ref 0.7–1.5)
CREAT SERPL-MCNC: 1 MG/DL — SIGNIFICANT CHANGE UP (ref 0.7–1.5)
EOSINOPHIL # BLD AUTO: 0.19 K/UL — SIGNIFICANT CHANGE UP (ref 0–0.7)
EOSINOPHIL NFR BLD AUTO: 4.2 % — SIGNIFICANT CHANGE UP (ref 0–8)
GLUCOSE BLDC GLUCOMTR-MCNC: 137 MG/DL — HIGH (ref 70–99)
GLUCOSE BLDC GLUCOMTR-MCNC: 151 MG/DL — HIGH (ref 70–99)
GLUCOSE BLDC GLUCOMTR-MCNC: 167 MG/DL — HIGH (ref 70–99)
GLUCOSE BLDC GLUCOMTR-MCNC: 173 MG/DL — HIGH (ref 70–99)
GLUCOSE BLDC GLUCOMTR-MCNC: 184 MG/DL — HIGH (ref 70–99)
GLUCOSE BLDC GLUCOMTR-MCNC: 224 MG/DL — HIGH (ref 70–99)
GLUCOSE BLDC GLUCOMTR-MCNC: 226 MG/DL — HIGH (ref 70–99)
GLUCOSE BLDC GLUCOMTR-MCNC: 295 MG/DL — HIGH (ref 70–99)
GLUCOSE BLDC GLUCOMTR-MCNC: 318 MG/DL — HIGH (ref 70–99)
GLUCOSE SERPL-MCNC: 186 MG/DL — HIGH (ref 70–99)
GLUCOSE SERPL-MCNC: 198 MG/DL — HIGH (ref 70–99)
GLUCOSE SERPL-MCNC: 291 MG/DL — HIGH (ref 70–99)
HCT VFR BLD CALC: 29.2 % — LOW (ref 42–52)
HGB BLD-MCNC: 10.2 G/DL — LOW (ref 14–18)
IMM GRANULOCYTES NFR BLD AUTO: 0.2 % — SIGNIFICANT CHANGE UP (ref 0.1–0.3)
INR BLD: 0.9 RATIO — SIGNIFICANT CHANGE UP (ref 0.65–1.3)
LYMPHOCYTES # BLD AUTO: 1.69 K/UL — SIGNIFICANT CHANGE UP (ref 1.2–3.4)
LYMPHOCYTES # BLD AUTO: 37.6 % — SIGNIFICANT CHANGE UP (ref 20.5–51.1)
MAGNESIUM SERPL-MCNC: 1.3 MG/DL — LOW (ref 1.8–2.4)
MAGNESIUM SERPL-MCNC: 1.6 MG/DL — LOW (ref 1.8–2.4)
MAGNESIUM SERPL-MCNC: 1.9 MG/DL — SIGNIFICANT CHANGE UP (ref 1.8–2.4)
MCHC RBC-ENTMCNC: 31.3 PG — HIGH (ref 27–31)
MCHC RBC-ENTMCNC: 34.9 G/DL — SIGNIFICANT CHANGE UP (ref 32–37)
MCV RBC AUTO: 89.6 FL — SIGNIFICANT CHANGE UP (ref 80–94)
MONOCYTES # BLD AUTO: 0.42 K/UL — SIGNIFICANT CHANGE UP (ref 0.1–0.6)
MONOCYTES NFR BLD AUTO: 9.4 % — HIGH (ref 1.7–9.3)
NEUTROPHILS # BLD AUTO: 2.13 K/UL — SIGNIFICANT CHANGE UP (ref 1.4–6.5)
NEUTROPHILS NFR BLD AUTO: 47.5 % — SIGNIFICANT CHANGE UP (ref 42.2–75.2)
PHOSPHATE SERPL-MCNC: 1.6 MG/DL — LOW (ref 2.1–4.9)
PHOSPHATE SERPL-MCNC: 2.4 MG/DL — SIGNIFICANT CHANGE UP (ref 2.1–4.9)
PLATELET # BLD AUTO: 240 K/UL — SIGNIFICANT CHANGE UP (ref 130–400)
POTASSIUM SERPL-MCNC: 3.7 MMOL/L — SIGNIFICANT CHANGE UP (ref 3.5–5)
POTASSIUM SERPL-MCNC: 4 MMOL/L — SIGNIFICANT CHANGE UP (ref 3.5–5)
POTASSIUM SERPL-MCNC: 4 MMOL/L — SIGNIFICANT CHANGE UP (ref 3.5–5)
POTASSIUM SERPL-SCNC: 3.7 MMOL/L — SIGNIFICANT CHANGE UP (ref 3.5–5)
POTASSIUM SERPL-SCNC: 4 MMOL/L — SIGNIFICANT CHANGE UP (ref 3.5–5)
POTASSIUM SERPL-SCNC: 4 MMOL/L — SIGNIFICANT CHANGE UP (ref 3.5–5)
PROT SERPL-MCNC: 5 G/DL — LOW (ref 6–8)
PROTHROM AB SERPL-ACNC: 10.4 SEC — SIGNIFICANT CHANGE UP (ref 9.95–12.87)
RBC # BLD: 3.26 M/UL — LOW (ref 4.7–6.1)
RBC # FLD: 11.8 % — SIGNIFICANT CHANGE UP (ref 11.5–14.5)
SODIUM SERPL-SCNC: 135 MMOL/L — SIGNIFICANT CHANGE UP (ref 135–146)
SODIUM SERPL-SCNC: 136 MMOL/L — SIGNIFICANT CHANGE UP (ref 135–146)
SODIUM SERPL-SCNC: 137 MMOL/L — SIGNIFICANT CHANGE UP (ref 135–146)
TROPONIN T SERPL-MCNC: <0.01 NG/ML — SIGNIFICANT CHANGE UP
WBC # BLD: 4.49 K/UL — LOW (ref 4.8–10.8)
WBC # FLD AUTO: 4.49 K/UL — LOW (ref 4.8–10.8)

## 2019-01-09 RX ORDER — SODIUM,POTASSIUM PHOSPHATES 278-250MG
1 POWDER IN PACKET (EA) ORAL ONCE
Qty: 0 | Refills: 0 | Status: COMPLETED | OUTPATIENT
Start: 2019-01-09 | End: 2019-01-09

## 2019-01-09 RX ORDER — DEXTROSE MONOHYDRATE, SODIUM CHLORIDE, AND POTASSIUM CHLORIDE 50; .745; 4.5 G/1000ML; G/1000ML; G/1000ML
1000 INJECTION, SOLUTION INTRAVENOUS
Qty: 0 | Refills: 0 | Status: DISCONTINUED | OUTPATIENT
Start: 2019-01-09 | End: 2019-01-09

## 2019-01-09 RX ORDER — MAGNESIUM SULFATE 500 MG/ML
2 VIAL (ML) INJECTION ONCE
Qty: 0 | Refills: 0 | Status: COMPLETED | OUTPATIENT
Start: 2019-01-09 | End: 2019-01-09

## 2019-01-09 RX ORDER — DEXTROSE 50 % IN WATER 50 %
15 SYRINGE (ML) INTRAVENOUS ONCE
Qty: 0 | Refills: 0 | Status: DISCONTINUED | OUTPATIENT
Start: 2019-01-09 | End: 2019-01-10

## 2019-01-09 RX ORDER — INSULIN LISPRO 100/ML
VIAL (ML) SUBCUTANEOUS
Qty: 0 | Refills: 0 | Status: DISCONTINUED | OUTPATIENT
Start: 2019-01-09 | End: 2019-01-10

## 2019-01-09 RX ORDER — FLUTICASONE PROPIONATE 50 MCG
1 SPRAY, SUSPENSION NASAL
Qty: 0 | Refills: 0 | Status: DISCONTINUED | OUTPATIENT
Start: 2019-01-09 | End: 2019-01-10

## 2019-01-09 RX ORDER — GLUCAGON INJECTION, SOLUTION 0.5 MG/.1ML
1 INJECTION, SOLUTION SUBCUTANEOUS ONCE
Qty: 0 | Refills: 0 | Status: DISCONTINUED | OUTPATIENT
Start: 2019-01-09 | End: 2019-01-10

## 2019-01-09 RX ORDER — INSULIN LISPRO 100/ML
7 VIAL (ML) SUBCUTANEOUS
Qty: 0 | Refills: 0 | Status: DISCONTINUED | OUTPATIENT
Start: 2019-01-09 | End: 2019-01-10

## 2019-01-09 RX ORDER — IBUPROFEN 200 MG
400 TABLET ORAL EVERY 8 HOURS
Qty: 0 | Refills: 0 | Status: DISCONTINUED | OUTPATIENT
Start: 2019-01-09 | End: 2019-01-10

## 2019-01-09 RX ORDER — DEXTROSE 50 % IN WATER 50 %
12.5 SYRINGE (ML) INTRAVENOUS ONCE
Qty: 0 | Refills: 0 | Status: DISCONTINUED | OUTPATIENT
Start: 2019-01-09 | End: 2019-01-10

## 2019-01-09 RX ORDER — INSULIN GLARGINE 100 [IU]/ML
20 INJECTION, SOLUTION SUBCUTANEOUS ONCE
Qty: 0 | Refills: 0 | Status: COMPLETED | OUTPATIENT
Start: 2019-01-09 | End: 2019-01-09

## 2019-01-09 RX ORDER — DEXTROSE 50 % IN WATER 50 %
25 SYRINGE (ML) INTRAVENOUS ONCE
Qty: 0 | Refills: 0 | Status: DISCONTINUED | OUTPATIENT
Start: 2019-01-09 | End: 2019-01-10

## 2019-01-09 RX ORDER — INSULIN GLARGINE 100 [IU]/ML
20 INJECTION, SOLUTION SUBCUTANEOUS EVERY MORNING
Qty: 0 | Refills: 0 | Status: DISCONTINUED | OUTPATIENT
Start: 2019-01-09 | End: 2019-01-10

## 2019-01-09 RX ORDER — INSULIN LISPRO 100/ML
5 VIAL (ML) SUBCUTANEOUS
Qty: 0 | Refills: 0 | Status: DISCONTINUED | OUTPATIENT
Start: 2019-01-09 | End: 2019-01-09

## 2019-01-09 RX ORDER — INSULIN LISPRO 100/ML
11 VIAL (ML) SUBCUTANEOUS ONCE
Qty: 0 | Refills: 0 | Status: COMPLETED | OUTPATIENT
Start: 2019-01-09 | End: 2019-01-09

## 2019-01-09 RX ORDER — ENOXAPARIN SODIUM 100 MG/ML
40 INJECTION SUBCUTANEOUS DAILY
Qty: 0 | Refills: 0 | Status: DISCONTINUED | OUTPATIENT
Start: 2019-01-09 | End: 2019-01-10

## 2019-01-09 RX ORDER — SODIUM CHLORIDE 9 MG/ML
1000 INJECTION, SOLUTION INTRAVENOUS
Qty: 0 | Refills: 0 | Status: DISCONTINUED | OUTPATIENT
Start: 2019-01-09 | End: 2019-01-10

## 2019-01-09 RX ORDER — INSULIN HUMAN 100 [IU]/ML
4 INJECTION, SOLUTION SUBCUTANEOUS
Qty: 100 | Refills: 0 | Status: DISCONTINUED | OUTPATIENT
Start: 2019-01-09 | End: 2019-01-09

## 2019-01-09 RX ADMIN — Medication 100 MILLIGRAM(S): at 18:19

## 2019-01-09 RX ADMIN — Medication 5 UNIT(S): at 18:19

## 2019-01-09 RX ADMIN — Medication 5 UNIT(S): at 13:17

## 2019-01-09 RX ADMIN — INSULIN GLARGINE 20 UNIT(S): 100 INJECTION, SOLUTION SUBCUTANEOUS at 06:11

## 2019-01-09 RX ADMIN — Medication 1 SPRAY(S): at 20:03

## 2019-01-09 RX ADMIN — Medication 1 PACKET(S): at 05:08

## 2019-01-09 RX ADMIN — Medication 100 MILLIGRAM(S): at 05:07

## 2019-01-09 RX ADMIN — INSULIN HUMAN 4 UNIT(S)/HR: 100 INJECTION, SOLUTION SUBCUTANEOUS at 06:25

## 2019-01-09 RX ADMIN — Medication 16.67 GRAM(S): at 01:55

## 2019-01-09 RX ADMIN — Medication 20 MILLIGRAM(S): at 05:07

## 2019-01-09 RX ADMIN — Medication 2: at 13:16

## 2019-01-09 RX ADMIN — SODIUM CHLORIDE 2000 MILLILITER(S): 9 INJECTION, SOLUTION INTRAVENOUS at 00:08

## 2019-01-09 RX ADMIN — ENOXAPARIN SODIUM 40 MILLIGRAM(S): 100 INJECTION SUBCUTANEOUS at 11:56

## 2019-01-09 RX ADMIN — Medication 3: at 18:20

## 2019-01-09 RX ADMIN — DEXTROSE MONOHYDRATE, SODIUM CHLORIDE, AND POTASSIUM CHLORIDE 150 MILLILITER(S): 50; .745; 4.5 INJECTION, SOLUTION INTRAVENOUS at 05:37

## 2019-01-09 NOTE — H&P ADULT - NSHPLABSRESULTS_GEN_ALL_CORE
Labs:                        12.6   6.35  )-----------( 310      ( 08 Jan 2019 15:45 )             37.4             01-08    137  |  98  |  18  ----------------------------<  198<H>  4.0   |  21  |  0.9    Ca    8.3<L>      08 Jan 2019 23:30  Phos  1.6     01-08  Mg     1.3     01-08    TPro  6.4  /  Alb  3.7  /  TBili  0.4  /  DBili  x   /  AST  8   /  ALT  13  /  AlkPhos  93  01-08    LIVER FUNCTIONS - ( 08 Jan 2019 15:45 )  Alb: 3.7 g/dL / Pro: 6.4 g/dL / ALK PHOS: 93 U/L / ALT: 13 U/L / AST: 8 U/L / GGT: x                   CARDIAC MARKERS ( 08 Jan 2019 23:30 )  x     / <0.01 ng/mL / 28 U/L / x     / <1.0 ng/mL  CARDIAC MARKERS ( 08 Jan 2019 16:30 )  x     / <0.01 ng/mL / x     / x     / x        Urinalysis Basic - ( 08 Jan 2019 17:15 )    Color: Yellow / Appearance: Clear / SG: >=1.030 / pH: x  Gluc: x / Ketone: >=80  / Bili: Negative / Urobili: 0.2   Blood: x / Protein: Negative / Nitrite: Negative   Leuk Esterase: Negative / RBC: x / WBC x   Sq Epi: x / Non Sq Epi: x / Bacteria: x    Imaging:  < from: CT Head No Cont (01.08.19 @ 16:57) >  Air-fluid levels within bilateral maxillary sinuses. Clinical correlation   for acute sinusitis.    < end of copied text >  < from: Xray Chest 1 View AP/PA (01.08.19 @ 16:19) >  No radiographic evidence of acute cardiopulmonary disease.  < end of copied text >    ECG:   Diagnosis Line *** Suspect arm leadreversal, interpretation assumes no reversal  Normal sinus rhythm  Rightward axis  Nonspecific ST and T wave abnormality  Prolonged QT  Abnormal ECG Labs:                        12.6   6.35  )-----------( 310      ( 08 Jan 2019 15:45 )             37.4             01-08    137  |  98  |  18  ----------------------------<  198<H>  4.0   |  21  |  0.9    Ca    8.3<L>      08 Jan 2019 23:30  Phos  1.6     01-08  Mg     1.3     01-08    TPro  6.4  /  Alb  3.7  /  TBili  0.4  /  DBili  x   /  AST  8   /  ALT  13  /  AlkPhos  93  01-08    LIVER FUNCTIONS - ( 08 Jan 2019 15:45 )  Alb: 3.7 g/dL / Pro: 6.4 g/dL / ALK PHOS: 93 U/L / ALT: 13 U/L / AST: 8 U/L / GGT: x                   CARDIAC MARKERS ( 08 Jan 2019 23:30 )  x     / <0.01 ng/mL / 28 U/L / x     / <1.0 ng/mL  CARDIAC MARKERS ( 08 Jan 2019 16:30 )  x     / <0.01 ng/mL / x     / x     / x        Urinalysis Basic - ( 08 Jan 2019 17:15 )    Color: Yellow / Appearance: Clear / SG: >=1.030 / pH: x  Gluc: x / Ketone: >=80  / Bili: Negative / Urobili: 0.2   Blood: x / Protein: Negative / Nitrite: Negative   Leuk Esterase: Negative / RBC: x / WBC x   Sq Epi: x / Non Sq Epi: x / Bacteria: x    Imaging:  < from: CT Head No Cont (01.08.19 @ 16:57) >  Air-fluid levels within bilateral maxillary sinuses. Clinical correlation   for acute sinusitis.    < end of copied text >  < from: Xray Chest 1 View AP/PA (01.08.19 @ 16:19) >  No radiographic evidence of acute cardiopulmonary disease.  < end of copied text >        Normal sinus rhythm  Nonspecific ST and T wave abnormality  Prolonged QT  (int by me-no sig findings)

## 2019-01-09 NOTE — H&P ADULT - NSHPPHYSICALEXAM_GEN_ALL_CORE
ICU Vital Signs Last 24 Hrs  T(C): 36.7 (09 Jan 2019 03:28), Max: 36.8 (08 Jan 2019 20:20)  T(F): 98 (09 Jan 2019 03:28), Max: 98.3 (08 Jan 2019 20:20)  HR: 81 (09 Jan 2019 03:28) (80 - 89)  BP: 117/76 (09 Jan 2019 03:28) (92/62 - 117/76)  RR: 18 (09 Jan 2019 03:28) (18 - 18)  SpO2: 100% (09 Jan 2019 03:28) (99% - 100%)    Constitutional: NAD  Eyes: anicteric sclera  Respiratory: clear  Cardiovascular: regular S1S2  Gastrointestinal: BS+, soft non tender   Genitourinary: no CVA tenderness  Extremities: no ext edema ICU Vital Signs Last 24 Hrs  T(C): 36.7 (09 Jan 2019 03:28), Max: 36.8 (08 Jan 2019 20:20)  T(F): 98 (09 Jan 2019 03:28), Max: 98.3 (08 Jan 2019 20:20)  HR: 81 (09 Jan 2019 03:28) (80 - 89)  BP: 117/76 (09 Jan 2019 03:28) (92/62 - 117/76)  RR: 18 (09 Jan 2019 03:28) (18 - 18)  SpO2: 100% (09 Jan 2019 03:28) (99% - 100%)    Constitutional: NAD  Optho: anicteric sclera  Respiratory: clear to aus b/l  Cardiovascular: regular S1S2  Gastrointestinal: BS+, soft non tender   Genitourinary: no CVA tenderness  Extremities: no ext edema  Neuro: 5/5 b/l, no sens deficits  Psych: a  x o x 3

## 2019-01-09 NOTE — H&P ADULT - HISTORY OF PRESENT ILLNESS
37yo m pmhx dm on metformin, recently dx with ear and throat infection and was prescribed prednisone presents with 3 days of intermittent dizziness, described as feeling like the room is spinning. pt states that on the first day he had one episode of nausea, vomiting, diarrhea, all non bloody. pt has been tolerating po since then. pt reports that he still has a slight sore throat but that it has been improving since starting prescribed medication. no cough no chest pain no SOB no fever no chills.    in the ED BP was borderline 92/62, afebrile  glucose=407 AG=24 urine ketones>80 venos Ph=7.33  s/p 5L and started on IV insulin

## 2019-01-09 NOTE — H&P ADULT - ATTENDING COMMENTS
I was signed out this patient when he was ready for downgrade from the MICU after his gap normalized.  He complained of gait difficulty and stair climbing challenges.  PT evaluated pt and found him able to ambulate 200 feet.  However, he os refusing to leave the hospital.  I discussed with the resident:    -d/c the prednisone as the likely culprit in inducing the dka  -supplement the MG as patient suffers from MAGNESIUM DEFICIENCY    I agree withe physical exam described above which concurs with my own post downgrade.

## 2019-01-09 NOTE — ED ADULT NURSE REASSESSMENT NOTE - NS ED NURSE REASSESS COMMENT FT1
120 - fs 186 att- demanding food at - ate a tuna sandwhich and suagr free pudding - spoke to ICU resident and request she put orders in

## 2019-01-09 NOTE — H&P ADULT - NSHPSOCIALHISTORY_GEN_ALL_CORE
Former smoker, quits 20days ago, used to smoke 1 ci per day  Occasionally drinks Beer  No IV drug use

## 2019-01-09 NOTE — CONSULT NOTE ADULT - ASSESSMENT
39 y/o male with sinusitis  - Increase hydration  - Flonase 2 sprays, BID to b/l nares  - HOB elevated  - Cont steroids as prescribed by Dr. Yates  - f/u with Dr. Yates in office with scheduled appt next week.  - will d/w attending

## 2019-01-09 NOTE — PHYSICAL THERAPY INITIAL EVALUATION ADULT - ASSISTIVE DEVICE FOR TRANSFER: GAIT, REHAB EVAL
Progressed to without walker by end of ambulation. Pt declined RW or cane for discharge/rolling walker

## 2019-01-09 NOTE — CONSULT NOTE ADULT - SUBJECTIVE AND OBJECTIVE BOX
Patient is a 38y old  Male who presents with a chief complaint of Dizziness (09 Jan 2019 03:39)      HPI:  37yo m pmhx dm on metformin, recently dx with ear and throat infection and was prescribed prednisone presents with 3 days of intermittent dizziness, described as feeling like the room is spinning. pt states that on the first day he had one episode of nausea, vomiting, diarrhea, all non bloody. pt has been tolerating po since then. pt reports that he still has a slight sore throat but that it has been improving since starting prescribed medication. no cough no chest pain no SOB no fever no chills.    in the ED BP was borderline 92/62, afebrile  glucose=407 AG=24 urine ketones>80 venos Ph=7.33  s/p 5L and started on IV insulin (09 Jan 2019 03:39)      PAST MEDICAL & SURGICAL HISTORY:  Diabetes  No significant past surgical history      SOCIAL HX:   Smoking  neg                       ETOH     neg                       Other  neg    FAMILY HISTORY:  No pertinent family history in first degree relatives  :  No known cardiovacular family hisotry     ROS:  See HPI     Allergies    No Known Allergies    Intolerances          PHYSICAL EXAM    ICU Vital Signs Last 24 Hrs  T(C): 36.7 (09 Jan 2019 05:19), Max: 36.8 (08 Jan 2019 20:20)  T(F): 98.1 (09 Jan 2019 05:19), Max: 98.3 (08 Jan 2019 20:20)  HR: 77 (09 Jan 2019 05:19) (77 - 89)  BP: 95/63 (09 Jan 2019 05:19) (92/62 - 117/76)  BP(mean): --  ABP: --  ABP(mean): --  RR: 18 (09 Jan 2019 05:19) (18 - 18)  SpO2: 100% (09 Jan 2019 05:19) (99% - 100%)      General: In NAD   HEENT: sinus tenderness on right side              Lymphatic system: No cervical LN   Lungs: Bilateral BS, clear  Cardiovascular: Regular  Gastrointestinal: Soft, Positive BS  Musculoskeletal: No clubbing.  Moves all extremities.  Full range of motion   Skin: Warm.  Intact  Neurological: No motor or sensory deficit       01-08-19 @ 07:01  -  01-09-19 @ 07:00  --------------------------------------------------------  IN:    insulin Infusion: 9 mL  Total IN: 9 mL    OUT:  Total OUT: 0 mL    Total NET: 9 mL          LABS:                          10.2   4.49  )-----------( 240      ( 09 Jan 2019 04:20 )             29.2                                               01-09    136  |  101  |  17  ----------------------------<  186<H>  3.7   |  25  |  0.6<L>    Ca    7.9<L>      09 Jan 2019 04:20  Phos  1.6     01-08  Mg     1.9     01-09    TPro  5.0<L>  /  Alb  2.7<L>  /  TBili  0.2  /  DBili  x   /  AST  8   /  ALT  9   /  AlkPhos  80  01-09      PT/INR - ( 09 Jan 2019 04:20 )   PT: 10.40 sec;   INR: 0.90 ratio         PTT - ( 09 Jan 2019 04:20 )  PTT:29.8 sec                                       Urinalysis Basic - ( 08 Jan 2019 17:15 )    Color: Yellow / Appearance: Clear / SG: >=1.030 / pH: x  Gluc: x / Ketone: >=80  / Bili: Negative / Urobili: 0.2   Blood: x / Protein: Negative / Nitrite: Negative   Leuk Esterase: Negative / RBC: x / WBC x   Sq Epi: x / Non Sq Epi: x / Bacteria: x        CARDIAC MARKERS ( 08 Jan 2019 23:30 )  x     / <0.01 ng/mL / 28 U/L / x     / <1.0 ng/mL  CARDIAC MARKERS ( 08 Jan 2019 16:30 )  x     / <0.01 ng/mL / x     / x     / x                                                LIVER FUNCTIONS - ( 09 Jan 2019 04:20 )  Alb: 2.7 g/dL / Pro: 5.0 g/dL / ALK PHOS: 80 U/L / ALT: 9 U/L / AST: 8 U/L / GGT: x                                                                                                                                       X-Rays   clear                                                                                      MEDICATIONS  (STANDING):  dextrose 5% + sodium chloride 0.9% with potassium chloride 20 mEq/L 1000 milliLiter(s) (150 mL/Hr) IV Continuous <Continuous>  doxycycline hyclate Capsule 100 milliGRAM(s) Oral every 12 hours  enoxaparin Injectable 40 milliGRAM(s) SubCutaneous daily  insulin Infusion 4 Unit(s)/Hr (4 mL/Hr) IV Continuous <Continuous>  predniSONE   Tablet 20 milliGRAM(s) Oral daily    MEDICATIONS  (PRN):  ibuprofen  Tablet. 400 milliGRAM(s) Oral every 8 hours PRN Moderate Pain (4 - 6)

## 2019-01-09 NOTE — CONSULT NOTE ADULT - ASSESSMENT
IMPRESSION:    DKA  Maxillary sinusitis      PLAN:    CNS: no depressants    HEENT: Oral care     PULMONARY:  HOB @ 45 degrees    CARDIOVASCULAR: Continue with IVF    GI: GI prophylaxis.  Feeding PO diet as toelrated    RENAL:  Follow up lytes.  Correct as needed.    INFECTIOUS DISEASE: Follow up cultures, continue with abx as per ENT.    HEMATOLOGICAL:  DVT prophylaxis.    ENDOCRINE:  Follow up FS.  Switch to SQ insulin. AG has already closed.    MUSCULOSKELETAL: out of bed     Downgrade to medical IMPRESSION:    DKA resolved  Maxillary sinusitis.  SP therapy otitis       PLAN:    CNS: no depressants    HEENT: Oral care     PULMONARY:  HOB @ 45 degrees    CARDIOVASCULAR: Continue with IVF    GI: GI prophylaxis.  Feeding PO diet as tolerated    RENAL:  Follow up lytes.  Correct as needed.    INFECTIOUS DISEASE: Follow up cultures, continue with abx as per ENT.    HEMATOLOGICAL:  DVT prophylaxis.    ENDOCRINE:  Follow up FS.  Switch to SQ insulin. AG has already closed.    MUSCULOSKELETAL: out of bed     Downgrade to medical

## 2019-01-09 NOTE — CONSULT NOTE ADULT - SUBJECTIVE AND OBJECTIVE BOX
ENT DAILY PROGRESS NOTE    Overnight events/Interval HPI: HPI:  37yo m pmhx dm on metformin, recently dx with ear and throat infection and was prescribed abx and prednisone. Presents today with 3 day hx of dizziness described as an off balance sensation when standing. Had an episode of vomiting on the first day but none since and he is tolerating solids/liquids well. Reports that his throat pain has all but resolved since starting the prescribed meds but he continues to have congestion of the R ear a/w pain around R maxillary sinus area.. Denies fevers, chills, N/V              Allergies    No Known Allergies    Intolerances        MEDICATIONS:  Antiinfectives:   doxycycline hyclate Capsule 100 milliGRAM(s) Oral every 12 hours    IV fluids:  dextrose 5%. 1000 milliLiter(s) IV Continuous <Continuous>    Hematologic/Anticoagulation:  enoxaparin Injectable 40 milliGRAM(s) SubCutaneous daily    Pain medications/Neuro:  ibuprofen  Tablet. 400 milliGRAM(s) Oral every 8 hours PRN    Endocrine Medications:   dextrose 40% Gel 15 Gram(s) Oral once PRN  dextrose 50% Injectable 12.5 Gram(s) IV Push once  dextrose 50% Injectable 25 Gram(s) IV Push once  dextrose 50% Injectable 25 Gram(s) IV Push once  glucagon  Injectable 1 milliGRAM(s) IntraMuscular once PRN  insulin glargine Injectable (LANTUS) 20 Unit(s) SubCutaneous every morning  insulin lispro (HumaLOG) corrective regimen sliding scale   SubCutaneous three times a day before meals  insulin lispro Injectable (HumaLOG) 5 Unit(s) SubCutaneous three times a day before meals  predniSONE   Tablet 20 milliGRAM(s) Oral daily    All other standing medications:     All other PRN medications:      Vital Signs Last 24 Hrs  T(C): 36.7 (09 Jan 2019 09:30), Max: 36.8 (08 Jan 2019 20:20)  T(F): 98 (09 Jan 2019 09:30), Max: 98.3 (08 Jan 2019 20:20)  HR: 83 (09 Jan 2019 09:30) (77 - 89)  BP: 101/72 (09 Jan 2019 09:30) (92/62 - 117/76)  BP(mean): --  RR: 18 (09 Jan 2019 09:30) (18 - 18)  SpO2: 99% (09 Jan 2019 09:30) (99% - 100%)      01-08 @ 07:01  -  01-09 @ 07:00  --------------------------------------------------------  IN:    insulin Infusion: 9 mL  Total IN: 9 mL    OUT:  Total OUT: 0 mL    Total NET: 9 mL            PHYSICAL EXAM:    ENT EXAM-   Constitutional: Well-developed, well-nourished.  No hoarseness. awake/alert, NAD, No trismus, tolerating secretions    Head:  normocephalic, atraumatic.   Ears:  Ear canals both clear.  Tympanic membranes both intact  Nose:  + nasal mucosa erythema and mild edema, no discharge, no bleed, + ttp over R maxillary sinus.  OC/OP:  Floor of mouth, buccal mucosa, lips, hard palate, soft palate, uvula, posterior pharyngeal wall normal.  Mucosa moist.  Neck:  Trachea midline.  Thyroid, parotid and submandibular glands normal.      LABS:  CBC-                        10.2   4.49  )-----------( 240      ( 09 Jan 2019 04:20 )             29.2     BMP/CMP-  09 Jan 2019 04:20    136    |  101    |  17     ----------------------------<  186    3.7     |  25     |  0.6      Ca    7.9        09 Jan 2019 04:20  Phos  1.6       08 Jan 2019 23:30  Mg     1.9       09 Jan 2019 04:20    TPro  5.0    /  Alb  2.7    /  TBili  0.2    /  DBili  x      /  AST  8      /  ALT  9      /  AlkPhos  80     09 Jan 2019 04:20    Coagulation Studies-  PT/INR - ( 09 Jan 2019 04:20 )   PT: 10.40 sec;   INR: 0.90 ratio         PTT - ( 09 Jan 2019 04:20 )  PTT:29.8 sec  Endocrine Panel-  Calcium, Total Serum: 7.9 mg/dL (01-09 @ 04:20)  Calcium, Total Serum: 8.3 mg/dL (01-08 @ 23:30)  Calcium, Total Serum: 8.5 mg/dL (01-08 @ 18:45)  Calcium, Total Serum: 9.5 mg/dL (01-08 @ 15:45)              RADIOLOGY & ADDITIONAL STUDIES:  < from: CT Head No Cont (01.08.19 @ 16:57) >    EXAM:  CT BRAIN            PROCEDURE DATE:  01/08/2019            INTERPRETATION:  Clinical History / Reason for exam: Dizziness    Technique: Multiple contiguous axial CT images of the head were obtained    from the base of the skull to the vertexwithout administration of   intravenous contrast. Coronal and sagittal reformats were obtained.    Comparison: CT head September 12, 2015    Findings:    The ventricles, basal cisterns and sulcal pattern are within normal   limits for the patient's stated age.      Grey-white differentiation is preserved.    There is no acute mass effect, midline shift or intracranial hemorrhage.      The calvarium is intact.    Opacification in the bilateral maxillary sinuses, with air-fluid levels.   Otherwise sinuses are well aerated.    Impression:     Air-fluid levels within bilateral maxillary sinuses. Clinical correlation   for acute sinusitis.    No CT evidence for acute intracranial pathology.              RADHA JOVEL M.D., RESIDENT RADIOLOGIST  This document has been electronically signed.  REFUGIO PENA M.D., ATTENDING RADIOLOGIST  This document has been electronically signed. Jan 8 2019  6:41PM                < end of copied text >

## 2019-01-09 NOTE — H&P ADULT - FAMILY HISTORY
No pertinent family history in first degree relatives No pertinent family history in first degree relatives, no diabetes type 2, vertigo, htn

## 2019-01-09 NOTE — PROVIDER CONTACT NOTE (OTHER) - SITUATION
0600 FS checked 169. Insulin drip @ 5 units/hr with Dextrose + NS + KCL 20 meq additive @150ml/hr hanging. MD made aware of FS.

## 2019-01-09 NOTE — H&P ADULT - ASSESSMENT
37yo m pmhx dm on metformin, recently dx with ear and throat infection and was prescribed prednisone presents with 3 days of intermittent dizziness and was found to be in DKA in the ED.    **DKA  -glucose 400 AG 24 positive ketones in urine ph=7.33 (venous)  -most likely 2/2 infection (otitis with sinusitis) with steroids use  -s/p IV hydration  -currently on IV insulin with potassium and IV hydration  -BMP q 4-6 hours, monitor electrolytes and treat accordingly  -lipase=32, trop neg *2    **Right otitis  -seen by dr Yates 1/2/2018  -s/p Laryngoscopy Nasopharynx Findings: the nasopharynx was normal. TVC Findings: bilateral true vocal cord edema. Arytenoid Findings: bilateral arytenoid erythema, arytenoid edema and arytenoid erythema. Posterior Cricoid Findings: interarytenoid area edematous  -keep prednisone for now, continue doxycycline  -ENt consult for follow up  **Dizziness  -can be secondary to ear infection  -CTH negative  -if no improvement after atbx, consider MRI Brain and Internal Auditory Canals w/wo Cont as was requested by dr Yates  **DVT ppx: lovenox  **MICU for now 37yo m pmhx dm on metformin, recently dx with ear and throat infection and was prescribed prednisone presents with 3 days of intermittent dizziness and was found to be in DKA in the ED.    **DKA  -glucose 400 AG 24 positive ketones in urine ph=7.33 (venous)  -most likely 2/2 infection (otitis with sinusitis) with steroids use  -s/p IV hydration  -currently on IV insulin with potassium and IV hydration  -BMP q 4-6 hours, monitor electrolytes and treat accordingly  -lipase=32, trop neg *2    **Right otitis  -seen by dr Yates 1/2/2018  -s/p Laryngoscopy Nasopharynx Findings: the nasopharynx was normal. TVC Findings: bilateral true vocal cord edema. Arytenoid Findings: bilateral arytenoid erythema, arytenoid edema and arytenoid erythema. Posterior Cricoid Findings: interarytenoid area edematous  -continue doxycycline  -ENt consult for follow up  **Dizziness  -can be secondary to ear infection  -CTH negative  -if no improvement after atbx, consider MRI Brain and Internal Auditory Canals w/wo Cont as was requested by dr Yates    **Magnesium Deficiency  -supplement PO; contributing factor to poor diabetes control  **DVT ppx: lovenox  **MICU for now------downgraded earlier today

## 2019-01-09 NOTE — PROVIDER CONTACT NOTE (OTHER) - RECOMMENDATIONS
As per MD do not stop insulin drip titrate insulin drip to 4 units/hr, maintained fluids at 150 ml/hr, and give 20 units of glargine SQ. Will titrate insulin drip down over the next 3-4 hours a per MD

## 2019-01-09 NOTE — CHART NOTE - NSCHARTNOTEFT_GEN_A_CORE
for details plz refer to H&P  AG closed, s/p lantus 20u at 7am, IV insulin and IV fluids stopped, the patient was given breakfast  will downgrade to medicine

## 2019-01-09 NOTE — PHYSICAL THERAPY INITIAL EVALUATION ADULT - GENERAL OBSERVATIONS, REHAB EVAL
200-225 pm; Chart reviewed. Pt seen in semireclined position on stretcher in ED hallway. Pt c/o pain in b/l knees and swelling in feet, pain appears to be tight hamstrings during ambulation

## 2019-01-10 VITALS
OXYGEN SATURATION: 100 % | SYSTOLIC BLOOD PRESSURE: 91 MMHG | TEMPERATURE: 97 F | RESPIRATION RATE: 18 BRPM | DIASTOLIC BLOOD PRESSURE: 58 MMHG | HEART RATE: 85 BPM

## 2019-01-10 LAB
ALBUMIN SERPL ELPH-MCNC: 3.3 G/DL — LOW (ref 3.5–5.2)
ALP SERPL-CCNC: 111 U/L — SIGNIFICANT CHANGE UP (ref 30–115)
ALT FLD-CCNC: 27 U/L — SIGNIFICANT CHANGE UP (ref 0–41)
ANION GAP SERPL CALC-SCNC: 12 MMOL/L — SIGNIFICANT CHANGE UP (ref 7–14)
AST SERPL-CCNC: 31 U/L — SIGNIFICANT CHANGE UP (ref 0–41)
BASOPHILS # BLD AUTO: 0.03 K/UL — SIGNIFICANT CHANGE UP (ref 0–0.2)
BASOPHILS NFR BLD AUTO: 0.5 % — SIGNIFICANT CHANGE UP (ref 0–1)
BILIRUB SERPL-MCNC: <0.2 MG/DL — SIGNIFICANT CHANGE UP (ref 0.2–1.2)
BUN SERPL-MCNC: 16 MG/DL — SIGNIFICANT CHANGE UP (ref 10–20)
CALCIUM SERPL-MCNC: 8.7 MG/DL — SIGNIFICANT CHANGE UP (ref 8.5–10.1)
CHLORIDE SERPL-SCNC: 99 MMOL/L — SIGNIFICANT CHANGE UP (ref 98–110)
CHOLEST SERPL-MCNC: 202 MG/DL — HIGH (ref 100–200)
CO2 SERPL-SCNC: 27 MMOL/L — SIGNIFICANT CHANGE UP (ref 17–32)
CREAT SERPL-MCNC: 0.5 MG/DL — LOW (ref 0.7–1.5)
EOSINOPHIL # BLD AUTO: 0.1 K/UL — SIGNIFICANT CHANGE UP (ref 0–0.7)
EOSINOPHIL NFR BLD AUTO: 1.6 % — SIGNIFICANT CHANGE UP (ref 0–8)
ESTIMATED AVERAGE GLUCOSE: >398 MG/DL — HIGH (ref 68–114)
GLUCOSE BLDC GLUCOMTR-MCNC: 157 MG/DL — HIGH (ref 70–99)
GLUCOSE BLDC GLUCOMTR-MCNC: 267 MG/DL — HIGH (ref 70–99)
GLUCOSE BLDC GLUCOMTR-MCNC: 279 MG/DL — HIGH (ref 70–99)
GLUCOSE BLDC GLUCOMTR-MCNC: 331 MG/DL — HIGH (ref 70–99)
GLUCOSE SERPL-MCNC: 369 MG/DL — HIGH (ref 70–99)
HBA1C BLD-MCNC: >15.5 % — HIGH (ref 4–5.6)
HCT VFR BLD CALC: 35.8 % — LOW (ref 42–52)
HDLC SERPL-MCNC: 65 MG/DL — SIGNIFICANT CHANGE UP
HGB BLD-MCNC: 12 G/DL — LOW (ref 14–18)
IMM GRANULOCYTES NFR BLD AUTO: 0.3 % — SIGNIFICANT CHANGE UP (ref 0.1–0.3)
LIPID PNL WITH DIRECT LDL SERPL: 124 MG/DL — SIGNIFICANT CHANGE UP (ref 4–129)
LYMPHOCYTES # BLD AUTO: 2.59 K/UL — SIGNIFICANT CHANGE UP (ref 1.2–3.4)
LYMPHOCYTES # BLD AUTO: 41.4 % — SIGNIFICANT CHANGE UP (ref 20.5–51.1)
MAGNESIUM SERPL-MCNC: 1.7 MG/DL — LOW (ref 1.8–2.4)
MCHC RBC-ENTMCNC: 29.8 PG — SIGNIFICANT CHANGE UP (ref 27–31)
MCHC RBC-ENTMCNC: 33.5 G/DL — SIGNIFICANT CHANGE UP (ref 32–37)
MCV RBC AUTO: 88.8 FL — SIGNIFICANT CHANGE UP (ref 80–94)
MONOCYTES # BLD AUTO: 0.55 K/UL — SIGNIFICANT CHANGE UP (ref 0.1–0.6)
MONOCYTES NFR BLD AUTO: 8.8 % — SIGNIFICANT CHANGE UP (ref 1.7–9.3)
NEUTROPHILS # BLD AUTO: 2.96 K/UL — SIGNIFICANT CHANGE UP (ref 1.4–6.5)
NEUTROPHILS NFR BLD AUTO: 47.4 % — SIGNIFICANT CHANGE UP (ref 42.2–75.2)
PLATELET # BLD AUTO: 248 K/UL — SIGNIFICANT CHANGE UP (ref 130–400)
POTASSIUM SERPL-MCNC: 4 MMOL/L — SIGNIFICANT CHANGE UP (ref 3.5–5)
POTASSIUM SERPL-SCNC: 4 MMOL/L — SIGNIFICANT CHANGE UP (ref 3.5–5)
PROT SERPL-MCNC: 5.6 G/DL — LOW (ref 6–8)
RBC # BLD: 4.03 M/UL — LOW (ref 4.7–6.1)
RBC # FLD: 11.9 % — SIGNIFICANT CHANGE UP (ref 11.5–14.5)
SODIUM SERPL-SCNC: 138 MMOL/L — SIGNIFICANT CHANGE UP (ref 135–146)
TOTAL CHOLESTEROL/HDL RATIO MEASUREMENT: 3.1 RATIO — LOW (ref 4–5.5)
TRIGL SERPL-MCNC: 138 MG/DL — SIGNIFICANT CHANGE UP (ref 10–149)
WBC # BLD: 6.25 K/UL — SIGNIFICANT CHANGE UP (ref 4.8–10.8)
WBC # FLD AUTO: 6.25 K/UL — SIGNIFICANT CHANGE UP (ref 4.8–10.8)

## 2019-01-10 RX ORDER — FLUTICASONE PROPIONATE 50 MCG
1 SPRAY, SUSPENSION NASAL
Qty: 1 | Refills: 0
Start: 2019-01-10

## 2019-01-10 RX ORDER — METFORMIN HYDROCHLORIDE 850 MG/1
1 TABLET ORAL
Qty: 60 | Refills: 0
Start: 2019-01-10 | End: 2019-02-08

## 2019-01-10 RX ORDER — INSULIN LISPRO 100/ML
9 VIAL (ML) SUBCUTANEOUS
Qty: 0 | Refills: 0 | Status: DISCONTINUED | OUTPATIENT
Start: 2019-01-10 | End: 2019-01-10

## 2019-01-10 RX ORDER — SODIUM CHLORIDE 9 MG/ML
500 INJECTION INTRAMUSCULAR; INTRAVENOUS; SUBCUTANEOUS ONCE
Qty: 0 | Refills: 0 | Status: COMPLETED | OUTPATIENT
Start: 2019-01-10 | End: 2019-01-10

## 2019-01-10 RX ADMIN — Medication 7 UNIT(S): at 12:34

## 2019-01-10 RX ADMIN — Medication 100 MILLIGRAM(S): at 05:40

## 2019-01-10 RX ADMIN — Medication 1 SPRAY(S): at 05:38

## 2019-01-10 RX ADMIN — Medication 100 MILLIGRAM(S): at 17:59

## 2019-01-10 RX ADMIN — SODIUM CHLORIDE 1000 MILLILITER(S): 9 INJECTION INTRAMUSCULAR; INTRAVENOUS; SUBCUTANEOUS at 01:49

## 2019-01-10 RX ADMIN — ENOXAPARIN SODIUM 40 MILLIGRAM(S): 100 INJECTION SUBCUTANEOUS at 12:35

## 2019-01-10 RX ADMIN — Medication 7 UNIT(S): at 08:25

## 2019-01-10 RX ADMIN — Medication 1 SPRAY(S): at 17:59

## 2019-01-10 RX ADMIN — INSULIN GLARGINE 20 UNIT(S): 100 INJECTION, SOLUTION SUBCUTANEOUS at 08:23

## 2019-01-10 RX ADMIN — Medication 9 UNIT(S): at 16:30

## 2019-01-10 RX ADMIN — Medication 11 UNIT(S): at 00:08

## 2019-01-10 RX ADMIN — Medication 3: at 12:35

## 2019-01-10 RX ADMIN — Medication 4: at 08:26

## 2019-01-10 RX ADMIN — Medication 1: at 16:30

## 2019-01-10 NOTE — PROGRESS NOTE ADULT - ASSESSMENT
1. DKA - Resolved now     **Right otitis Medica -   -seen by dr Yates 1/2/2018  -s/p Laryngoscopy Nasopharynx Findings: the nasopharynx was normal. TVC Findings: bilateral true vocal cord edema. Arytenoid Findings: bilateral arytenoid erythema, arytenoid edema and arytenoid erythema. Posterior Cricoid Findings: interarytenoid area edematous  -continue doxycycline  -ENt consult for follow up out patient in one week   **Dizziness  -can be secondary to ear infection  -CTH negative  O/P foLlow up with ENT     **Magnesium Deficiency  -supplement PO; contributing factor to poor diabetes control  Stable to D/C home and out patient follow ups

## 2019-01-10 NOTE — DISCHARGE NOTE ADULT - HOSPITAL COURSE
Admitted with DKA, has hx of poorly controlled DM, was recently started on prednisone for sinusitis as outpatient  Started on IV insulin drip, transitioned to subcut insulin.  Will take metformin and glipizide on discharge. Prednisone stopped.  Seen by ENT, started on fluticasone nasal spray, will f/u in 1-2 weeks   Follow up in [facility], given number to make appointment (clinic currently closed and can not make appointment now)  Follow up with Dr. Corrales in endocrine clinic  carb consistent diet

## 2019-01-10 NOTE — DISCHARGE NOTE ADULT - PROVIDER TOKENS
TOKEN:'[id number]',FREE:[LAST:[[facility] clinic],PHONE:[phone]],FAX:[(   [phone]    -],ADDRESS:[Primary Care Clinic   [address] [place] [address]]],TOKEN:'[id number]'

## 2019-01-10 NOTE — DISCHARGE NOTE ADULT - CARE PLAN
Principal Discharge DX:	DKA (diabetic ketoacidoses)  Goal:	clinical stabililty  Assessment and plan of treatment:	start metformin and glipizide  low carbohydrate diet  follow up in primary care clinic and the endocrinology  Secondary Diagnosis:	Sinusitis  Assessment and plan of treatment:	take fluticasone nasal spray twice daily  follow up with ENT

## 2019-01-10 NOTE — DISCHARGE NOTE ADULT - PLAN OF CARE
clinical stabililty start metformin and glipizide  low carbohydrate diet  follow up in primary care clinic and the endocrinology take fluticasone nasal spray twice daily  follow up with ENT

## 2019-01-10 NOTE — DISCHARGE NOTE ADULT - MEDICATION SUMMARY - MEDICATIONS TO TAKE
I will START or STAY ON the medications listed below when I get home from the hospital:    Glucophage 850 mg oral tablet  -- 1 tab(s) by mouth 2 times a day   -- Check with your doctor before becoming pregnant.  Do not drink alcoholic beverages when taking this medication.  It is very important that you take or use this exactly as directed.  Do not skip doses or discontinue unless directed by your doctor.  Obtain medical advice before taking any non-prescription drugs as some may affect the action of this medication.  Take with food or milk.    -- Indication: For Diabetes    glipiZIDE 5 mg oral tablet  -- 1 tab(s) by mouth once a day   -- Avoid prolonged or excessive exposure to direct and/or artificial sunlight while taking this medication.  Do not drink alcoholic beverages when taking this medication.  It is very important that you take or use this exactly as directed.  Do not skip doses or discontinue unless directed by your doctor.  Take this medicine 30 minutes before a meal. Read label carefully for how many times to take each day.    -- Indication: For Diabetes    fluticasone 50 mcg/inh nasal spray  -- 1 spray(s) into nose 2 times a day  -- Indication: For sinusitis

## 2019-01-10 NOTE — CHART NOTE - NSCHARTNOTEFT_GEN_A_CORE
Discussed patient with Dr. Diaz  can be discharged on metformin 850mg bid and glipizide 5mg od, stop insulin  follow up with endocrine and in [facility]  gave oral discharge instructions to patient via  [place], [place] [id number]  patient agreeable for discharge.

## 2019-01-10 NOTE — DISCHARGE NOTE ADULT - MEDICATION SUMMARY - MEDICATIONS TO STOP TAKING
I will STOP taking the medications listed below when I get home from the hospital:    clindamycin 300 mg oral capsule  -- 1 cap(s) by mouth 2 times a day   -- Finish all this medication unless otherwise directed by prescriber.  Medication should be taken with plenty of water.    Motrin  mg oral capsule  -- 2 cap(s) by mouth 3 times a day   -- Chew tablets before swallowing  Do not take this drug if you are pregnant.  It is very important that you take or use this exactly as directed.  Do not skip doses or discontinue unless directed by your doctor.  May cause drowsiness or dizziness.  Obtain medical advice before taking any non-prescription drugs as some may affect the action of this medication.  Take with food or milk.    Augmentin 875 mg-125 mg oral tablet  -- 1 tab(s) by mouth every 12 hours   -- Finish all this medication unless otherwise directed by prescriber.  Take with food or milk.    doxycycline hyclate 100 mg oral tablet  -- 1 tab(s) by mouth 2 times a day    predniSONE 10 mg oral tablet  -- 1 tab(s) by mouth once a day taper dose from 1/2/19  4 tab for 3 days then 3 for 3, 2 for 3, 1 for 3

## 2019-01-10 NOTE — PROGRESS NOTE ADULT - SUBJECTIVE AND OBJECTIVE BOX
PROGRESS NOTE  Chief Complaint:  Patient is a 38y old  Male who presents with a chief complaint of Dizziness (09 Jan 2019 12:58)      HPI  39yo m pmhx dm on metformin, recently dx with ear and throat infection and was prescribed prednisone presents with 3 days of intermittent dizziness, described as feeling like the room is spinning. pt states that on the first day he had one episode of nausea, vomiting, diarrhea, all non bloody. pt has been tolerating po since then. pt reports that he still has a slight sore throat but that it has been improving since starting prescribed medication. no cough no chest pain no SOB no fever no chills.    in the ED BP was borderline 92/62, afebrile  glucose=407 AG=24 urine ketones>80 venos Ph=7.33  s/p 5L and started on IV insulin (09 Jan 2019 03:39)      ALLERGIES:  No Known Allergies      HOSPITAL MEDICATIONS:  MEDICATIONS  (STANDING):  dextrose 5%. 1000 milliLiter(s) (50 mL/Hr) IV Continuous <Continuous>  dextrose 50% Injectable 12.5 Gram(s) IV Push once  dextrose 50% Injectable 25 Gram(s) IV Push once  dextrose 50% Injectable 25 Gram(s) IV Push once  doxycycline hyclate Capsule 100 milliGRAM(s) Oral every 12 hours  enoxaparin Injectable 40 milliGRAM(s) SubCutaneous daily  fluticasone propionate 50 MICROgram(s)/spray Nasal Spray 1 Spray(s) Both Nostrils two times a day  insulin glargine Injectable (LANTUS) 20 Unit(s) SubCutaneous every morning  insulin lispro (HumaLOG) corrective regimen sliding scale   SubCutaneous three times a day before meals  insulin lispro Injectable (HumaLOG) 9 Unit(s) SubCutaneous three times a day before meals    MEDICATIONS  (PRN):  dextrose 40% Gel 15 Gram(s) Oral once PRN Blood Glucose LESS THAN 70 milliGRAM(s)/deciliter  glucagon  Injectable 1 milliGRAM(s) IntraMuscular once PRN Glucose LESS THAN 70 milligrams/deciliter  ibuprofen  Tablet. 400 milliGRAM(s) Oral every 8 hours PRN Moderate Pain (4 - 6)      PMHX/PSHX:  Diabetes  No pertinent past medical history  No significant past surgical history      FAMILY HISTORY:  No pertinent family history in first degree relatives   Social - non compliant     REVIEW OF SYSTEMS:     General:  No wt loss, fevers, chills, night sweats, fatigue,   Eyes:  Good vision, no reported pain  ENT:  No sore throat, pain, runny nose, dysphagia  CV:  No pain, palpitations, hypo/hypertension  Resp:  No dyspnea, cough, tachypnea, wheezing  GI:  No pain, No nausea, No vomiting, No diarrhea, No constipation, No weight loss, No fever, No pruritis, No rectal bleeding, No tarry stools, No dysphagia,  :  No pain, bleeding, incontinence, nocturia  Muscle:  No pain, weakness  Neuro:  No weakness, tingling, memory problems  Psych:  No fatigue, insomnia, mood problems, depression  Endocrine:  No polyuria, polydipsia, cold/heat intolerance  Heme:  No petechiae, ecchymosis, easy bruisability  Skin:  No rash, tattoos, scars, edema      PHYSICAL EXAM:   Vital Signs:  Vital Signs Last 24 Hrs  T(C): 36.1 (10 Cuate 2019 15:05), Max: 36.3 (10 Cuate 2019 07:39)  T(F): 97 (10 Cuate 2019 15:05), Max: 97.3 (10 Cuate 2019 07:39)  HR: 85 (10 Cuate 2019 15:05) (80 - 88)  BP: 91/58 (10 Cuate 2019 15:05) (77/44 - 105/72)  RR: 18 (10 Cuate 2019 15:05) (18 - 20)  SpO2: 100% (10 Cuate 2019 15:05) (99% - 100%)      GENERAL:  Appears stated age, well-groomed, well-nourished, no distress  HEENT:  NC/AT,  conjunctivae clear and pink, no thyromegaly, nodules, adenopathy, no JVD, sclera -anicteric  CHEST:  Full & symmetric excursion, no increased effort, breath sounds clear  HEART:  Regular rhythm, S1, S2, no murmur/rub/S3/S4, no abdominal bruit, no edema  ABDOMEN:  Soft, non-tender, non-distended, normoactive bowel sounds,  no masses ,no hepato-splenomegaly, no signs of chronic liver disease  EXTEREMITIES:  no cyanosis,clubbing or edema  SKIN:  No rash/erythema/ecchymoses/petechiae/wounds/abscess/warm/dry  NEURO:  Alert, oriented, no asterixis, no tremor, no encephalopathy    LABS:                        12.0   6.25  )-----------( 248      ( 10 Cuate 2019 08:10 )             35.8     01-10    138  |  99  |  16  ----------------------------<  369<H>  4.0   |  27  |  0.5<L>    Ca    8.7      10 Cuate 2019 08:10  Phos  2.4     01-09  Mg     1.7     01-10    TPro  5.6<L>  /  Alb  3.3<L>  /  TBili  <0.2  /  DBili  x   /  AST  31  /  ALT  27  /  AlkPhos  111  01-10    LIVER FUNCTIONS - ( 10 Cuate 2019 08:10 )  Alb: 3.3 g/dL / Pro: 5.6 g/dL / ALK PHOS: 111 U/L / ALT: 27 U/L / AST: 31 U/L / GGT: x           PT/INR - ( 09 Jan 2019 04:20 )   PT: 10.40 sec;   INR: 0.90 ratio         PTT - ( 09 Jan 2019 04:20 )  PTT:29.8 sec  Urinalysis Basic - ( 08 Jan 2019 17:15 )    Color: Yellow / Appearance: Clear / SG: >=1.030 / pH: x  Gluc: x / Ketone: >=80  / Bili: Negative / Urobili: 0.2   Blood: x / Protein: Negative / Nitrite: Negative   Leuk Esterase: Negative / RBC: x / WBC x   Sq Epi: x / Non Sq Epi: x / Bacteria: x        ASSESSMENT & PLAN:

## 2019-01-10 NOTE — DISCHARGE NOTE ADULT - CARE PROVIDER_API CALL
Thomas Corrales), Internal Medicine  [address] [place] [address]  Phone: [phone]  Fax: [phone]    [facility],   Primary Care Clinic   [address] [facility]  Phone: [phone]  Fax: (   )    -    Ambrosio Yates), Otolaryngology  [address] [facility] [facility]  [address] [place] [address]  Phone: [phone]  Fax: [phone]

## 2019-01-10 NOTE — DISCHARGE NOTE ADULT - CARE PROVIDERS DIRECT ADDRESSES
[email or web address]

## 2019-01-11 ENCOUNTER — OTHER (OUTPATIENT)
Age: 39
End: 2019-01-11

## 2019-01-14 LAB
CULTURE RESULTS: SIGNIFICANT CHANGE UP
SPECIMEN SOURCE: SIGNIFICANT CHANGE UP

## 2019-01-16 DIAGNOSIS — J01.00 ACUTE MAXILLARY SINUSITIS, UNSPECIFIED: ICD-10-CM

## 2019-01-16 DIAGNOSIS — E11.10 TYPE 2 DIABETES MELLITUS WITH KETOACIDOSIS WITHOUT COMA: ICD-10-CM

## 2019-01-16 DIAGNOSIS — E83.42 HYPOMAGNESEMIA: ICD-10-CM

## 2019-01-16 DIAGNOSIS — H66.91 OTITIS MEDIA, UNSPECIFIED, RIGHT EAR: ICD-10-CM

## 2019-01-17 ENCOUNTER — OUTPATIENT (OUTPATIENT)
Dept: OUTPATIENT SERVICES | Facility: HOSPITAL | Age: 39
LOS: 1 days | Discharge: HOME | End: 2019-01-17

## 2019-01-17 ENCOUNTER — OTHER (OUTPATIENT)
Age: 39
End: 2019-01-17

## 2019-01-17 ENCOUNTER — APPOINTMENT (OUTPATIENT)
Dept: OTOLARYNGOLOGY | Facility: CLINIC | Age: 39
End: 2019-01-17
Payer: MEDICAID

## 2019-01-17 VITALS — DIASTOLIC BLOOD PRESSURE: 76 MMHG | SYSTOLIC BLOOD PRESSURE: 128 MMHG

## 2019-01-17 VITALS — HEIGHT: 68 IN | WEIGHT: 120 LBS | BODY MASS INDEX: 18.19 KG/M2

## 2019-01-17 PROCEDURE — 99212 OFFICE O/P EST SF 10 MIN: CPT

## 2019-01-17 NOTE — PHYSICAL EXAM
[de-identified] : clicking and right otalgia upon opening/closing mouth [Midline] : trachea located in midline position [Normal] : no rashes

## 2019-01-17 NOTE — HISTORY OF PRESENT ILLNESS
[FreeTextEntry1] : Patient returns to the office to evaluate otalgia and hearing loss. Pt did not complete MRI. Pt was compliant with medications and has had little improvement. Pt has dull ear pain, and frontal headache, and some light headedness. Pt has tried sprays as well with little effect.  The headache and dizziness have resolved.

## 2019-01-24 ENCOUNTER — OUTPATIENT (OUTPATIENT)
Dept: OUTPATIENT SERVICES | Facility: HOSPITAL | Age: 39
LOS: 1 days | Discharge: HOME | End: 2019-01-24

## 2019-02-28 ENCOUNTER — APPOINTMENT (OUTPATIENT)
Dept: OTOLARYNGOLOGY | Facility: CLINIC | Age: 39
End: 2019-02-28
Payer: MEDICAID

## 2019-02-28 VITALS
BODY MASS INDEX: 18.19 KG/M2 | WEIGHT: 120 LBS | SYSTOLIC BLOOD PRESSURE: 122 MMHG | HEIGHT: 68 IN | DIASTOLIC BLOOD PRESSURE: 84 MMHG

## 2019-02-28 DIAGNOSIS — R07.0 PAIN IN THROAT: ICD-10-CM

## 2019-02-28 DIAGNOSIS — M26.609 UNSPECIFIED TEMPOROMANDIBULAR JOINT DISORDER: ICD-10-CM

## 2019-02-28 PROCEDURE — 31575 DIAGNOSTIC LARYNGOSCOPY: CPT

## 2019-02-28 PROCEDURE — 99212 OFFICE O/P EST SF 10 MIN: CPT | Mod: 25

## 2019-02-28 NOTE — PROCEDURE
[Topical Lidocaine] : topical lidocaine [Oxymetazoline HCl] : oxymetazoline HCl [Flexible Endoscope] : examined with the flexible endoscope [Complicated Symptoms] : complicated symptoms requiring more thorough examination than provided by mirror [Normal] : posterior cricoid area had healthy pink mucosa in the interarytenoid area and the esophageal inlet

## 2019-02-28 NOTE — PHYSICAL EXAM
[Normal] : mucosa is normal [Midline] : trachea located in midline position [Laryngoscopy Performed] : laryngoscopy was performed, see procedure section for findings [de-identified] : nontender but clicking upon opening and closing mouth

## 2019-02-28 NOTE — HISTORY OF PRESENT ILLNESS
[FreeTextEntry1] : Pt returns to the office to evaluate TMJ.  Otalgia has improved since last visit. He states that it comes and goes.  He has not seen the dentist yet. He takes Ibuprofen for the pain. Pain 6 on 1-10 scale when present. \par Patient has FB sensation again, he stopped taking the Pantoprazole. He denies associated symptoms, no dysphagia//odynophagia or hoarse voice.

## 2019-03-14 ENCOUNTER — OUTPATIENT (OUTPATIENT)
Dept: OUTPATIENT SERVICES | Facility: HOSPITAL | Age: 39
LOS: 1 days | Discharge: HOME | End: 2019-03-14

## 2019-03-14 DIAGNOSIS — K02.63 DENTAL CARIES ON SMOOTH SURFACE PENETRATING INTO PULP: ICD-10-CM

## 2019-04-11 ENCOUNTER — OUTPATIENT (OUTPATIENT)
Dept: OUTPATIENT SERVICES | Facility: HOSPITAL | Age: 39
LOS: 1 days | Discharge: HOME | End: 2019-04-11

## 2019-05-22 ENCOUNTER — APPOINTMENT (OUTPATIENT)
Dept: ENDOCRINOLOGY | Facility: CLINIC | Age: 39
End: 2019-05-22

## 2019-05-22 ENCOUNTER — OUTPATIENT (OUTPATIENT)
Dept: OUTPATIENT SERVICES | Facility: HOSPITAL | Age: 39
LOS: 1 days | Discharge: HOME | End: 2019-05-22

## 2019-05-22 VITALS
WEIGHT: 107.2 LBS | HEIGHT: 68 IN | BODY MASS INDEX: 16.25 KG/M2 | HEART RATE: 94 BPM | DIASTOLIC BLOOD PRESSURE: 83 MMHG | SYSTOLIC BLOOD PRESSURE: 123 MMHG

## 2019-05-22 DIAGNOSIS — E10.29 TYPE 1 DIABETES MELLITUS WITH OTHER DIABETIC KIDNEY COMPLICATION: ICD-10-CM

## 2019-05-22 DIAGNOSIS — Z91.19 PATIENT'S NONCOMPLIANCE WITH OTHER MEDICAL TREATMENT AND REGIMEN: ICD-10-CM

## 2019-05-22 DIAGNOSIS — R80.9 TYPE 1 DIABETES MELLITUS WITH OTHER DIABETIC KIDNEY COMPLICATION: ICD-10-CM

## 2019-05-22 DIAGNOSIS — E10.65 TYPE 1 DIABETES MELLITUS WITH OTHER DIABETIC KIDNEY COMPLICATION: ICD-10-CM

## 2019-05-22 DIAGNOSIS — Z86.39 PERSONAL HISTORY OF OTHER ENDOCRINE, NUTRITIONAL AND METABOLIC DISEASE: ICD-10-CM

## 2019-05-22 LAB — GLUCOSE BLDC GLUCOMTR-MCNC: >600 MG/DL — CRITICAL HIGH (ref 70–99)

## 2019-05-22 RX ORDER — INSULIN GLARGINE 100 [IU]/ML
100 INJECTION, SOLUTION SUBCUTANEOUS
Refills: 0 | Status: ACTIVE | COMMUNITY

## 2019-05-22 RX ORDER — ASPIRIN 81 MG
81 TABLET, DELAYED RELEASE (ENTERIC COATED) ORAL
Refills: 0 | Status: ACTIVE | COMMUNITY

## 2019-05-22 RX ORDER — INSULIN ASPART INJECTION 100 [IU]/ML
100 INJECTION, SOLUTION SUBCUTANEOUS
Refills: 0 | Status: ACTIVE | COMMUNITY

## 2019-05-22 NOTE — ASSESSMENT
[Carbohydrate Consistent Diet] : carbohydrate consistent diet [Hypoglycemia Management] : hypoglycemia management [Diabetes Foot Care] : diabetes foot care [Long Term Vascular Complications] : long term vascular complications of diabetes [Action and use of Insulin] : action and use of short and long-acting insulin [Insulin Self-Administration] : insulin self-administration [Injection Technique, Storage, Sharps Disposal] : injection technique, storage, and sharps disposal [FreeTextEntry1] : type 1 diabetes, no health insurance, unable to afford any insulin. - noncompliant .\par \par -was prescribed glimepiride, metformin, pioglitazone, and Toujeo last visit\par -only taking the metformin\par -counseled on checking blood sugar \par -fs too high for machine\par -sending home with basaglar 16 and FIASP  4 units before meals, patient does not have insurance\par -patient will buy test strips and glucometer battery\par -f/u a1c, cmp, cbc\par DR. west will be supplying insulin to him.\par \par Counseling: The patient was counseled on carbohydrate consistent diet, hypoglycemia management, diabetes foot care, long term vascular complications of diabetes, importance of diet and exercise to improve glycemic control, achieve weight loss and improve cardiovascular health.

## 2019-05-22 NOTE — PHYSICAL EXAM
[Alert] : alert [Normal Sclera/Conjunctiva] : normal sclera/conjunctiva [No Neck Mass] : no neck mass was observed [No Respiratory Distress] : no respiratory distress [Normal Rate and Effort] : normal respiratory rhythm and effort [Normal Rate] : heart rate was normal  [Normal S1, S2] : normal S1 and S2 [Normal Bowel Sounds] : normal bowel sounds [No CVA Tenderness] : no ~M costovertebral angle tenderness

## 2019-05-22 NOTE — HISTORY OF PRESENT ILLNESS
[FreeTextEntry1] : MARYANNE NIXON is a 38 year old male being seen for a one year dm follow-up visit.  Stated he has been having diarrhea that started 2 days ago.  No increased urinary freqency.  Does not check glucose at home

## 2019-06-05 ENCOUNTER — OUTPATIENT (OUTPATIENT)
Dept: OUTPATIENT SERVICES | Facility: HOSPITAL | Age: 39
LOS: 1 days | Discharge: HOME | End: 2019-06-05

## 2019-07-11 ENCOUNTER — OUTPATIENT (OUTPATIENT)
Dept: OUTPATIENT SERVICES | Facility: HOSPITAL | Age: 39
LOS: 1 days | Discharge: HOME | End: 2019-07-11
Payer: MEDICAID

## 2019-07-11 PROCEDURE — 92012 INTRM OPH EXAM EST PATIENT: CPT

## 2019-07-11 PROCEDURE — 92134 CPTRZ OPH DX IMG PST SGM RTA: CPT | Mod: 26

## 2019-08-14 ENCOUNTER — APPOINTMENT (OUTPATIENT)
Dept: ENDOCRINOLOGY | Facility: CLINIC | Age: 39
End: 2019-08-14

## 2020-01-01 ENCOUNTER — INPATIENT (INPATIENT)
Facility: HOSPITAL | Age: 40
LOS: 0 days | Discharge: HOME | End: 2020-01-02
Attending: HOSPITALIST | Admitting: HOSPITALIST
Payer: MEDICAID

## 2020-01-01 VITALS — WEIGHT: 125 LBS | TEMPERATURE: 99 F | OXYGEN SATURATION: 99 % | RESPIRATION RATE: 20 BRPM | HEART RATE: 128 BPM

## 2020-01-01 LAB
ALBUMIN SERPL ELPH-MCNC: 3.9 G/DL — SIGNIFICANT CHANGE UP (ref 3.5–5.2)
ALP SERPL-CCNC: 99 U/L — SIGNIFICANT CHANGE UP (ref 30–115)
ALT FLD-CCNC: 41 U/L — SIGNIFICANT CHANGE UP (ref 0–41)
ANION GAP SERPL CALC-SCNC: 18 MMOL/L — HIGH (ref 7–14)
APPEARANCE UR: CLEAR — SIGNIFICANT CHANGE UP
AST SERPL-CCNC: 29 U/L — SIGNIFICANT CHANGE UP (ref 0–41)
BASE EXCESS BLDV CALC-SCNC: 3.6 MMOL/L — HIGH (ref -2–2)
BASOPHILS # BLD AUTO: 0.02 K/UL — SIGNIFICANT CHANGE UP (ref 0–0.2)
BASOPHILS NFR BLD AUTO: 0.3 % — SIGNIFICANT CHANGE UP (ref 0–1)
BILIRUB SERPL-MCNC: 0.4 MG/DL — SIGNIFICANT CHANGE UP (ref 0.2–1.2)
BILIRUB UR-MCNC: NEGATIVE — SIGNIFICANT CHANGE UP
BUN SERPL-MCNC: 24 MG/DL — HIGH (ref 10–20)
CA-I SERPL-SCNC: 1.17 MMOL/L — SIGNIFICANT CHANGE UP (ref 1.12–1.3)
CALCIUM SERPL-MCNC: 9.4 MG/DL — SIGNIFICANT CHANGE UP (ref 8.5–10.1)
CHLORIDE SERPL-SCNC: 96 MMOL/L — LOW (ref 98–110)
CO2 SERPL-SCNC: 21 MMOL/L — SIGNIFICANT CHANGE UP (ref 17–32)
COLOR SPEC: SIGNIFICANT CHANGE UP
CREAT SERPL-MCNC: 0.9 MG/DL — SIGNIFICANT CHANGE UP (ref 0.7–1.5)
DIFF PNL FLD: SIGNIFICANT CHANGE UP
EOSINOPHIL # BLD AUTO: 0 K/UL — SIGNIFICANT CHANGE UP (ref 0–0.7)
EOSINOPHIL NFR BLD AUTO: 0 % — SIGNIFICANT CHANGE UP (ref 0–8)
FLU A RESULT: POSITIVE
FLU A RESULT: POSITIVE
FLUAV AG NPH QL: POSITIVE
FLUBV AG NPH QL: NEGATIVE — SIGNIFICANT CHANGE UP
GAS PNL BLDV: 137 MMOL/L — SIGNIFICANT CHANGE UP (ref 136–145)
GAS PNL BLDV: SIGNIFICANT CHANGE UP
GLUCOSE BLDC GLUCOMTR-MCNC: 210 MG/DL — HIGH (ref 70–99)
GLUCOSE BLDC GLUCOMTR-MCNC: 270 MG/DL — HIGH (ref 70–99)
GLUCOSE SERPL-MCNC: 435 MG/DL — HIGH (ref 70–99)
GLUCOSE UR QL: ABNORMAL
HCO3 BLDV-SCNC: 28 MMOL/L — SIGNIFICANT CHANGE UP (ref 22–29)
HCT VFR BLD CALC: 36 % — LOW (ref 42–52)
HCT VFR BLDA CALC: 33.5 % — LOW (ref 34–44)
HGB BLD CALC-MCNC: 10.9 G/DL — LOW (ref 14–18)
HGB BLD-MCNC: 12 G/DL — LOW (ref 14–18)
IMM GRANULOCYTES NFR BLD AUTO: 0.3 % — SIGNIFICANT CHANGE UP (ref 0.1–0.3)
KETONES UR-MCNC: ABNORMAL
LACTATE BLDV-MCNC: 1.3 MMOL/L — SIGNIFICANT CHANGE UP (ref 0.5–1.6)
LACTATE BLDV-MCNC: 2.9 MMOL/L — HIGH (ref 0.5–1.6)
LACTATE SERPL-SCNC: 3.5 MMOL/L — HIGH (ref 0.7–2)
LEUKOCYTE ESTERASE UR-ACNC: NEGATIVE — SIGNIFICANT CHANGE UP
LIDOCAIN IGE QN: 92 U/L — HIGH (ref 7–60)
LYMPHOCYTES # BLD AUTO: 0.95 K/UL — LOW (ref 1.2–3.4)
LYMPHOCYTES # BLD AUTO: 12 % — LOW (ref 20.5–51.1)
MCHC RBC-ENTMCNC: 29.7 PG — SIGNIFICANT CHANGE UP (ref 27–31)
MCHC RBC-ENTMCNC: 33.3 G/DL — SIGNIFICANT CHANGE UP (ref 32–37)
MCV RBC AUTO: 89.1 FL — SIGNIFICANT CHANGE UP (ref 80–94)
MONOCYTES # BLD AUTO: 0.34 K/UL — SIGNIFICANT CHANGE UP (ref 0.1–0.6)
MONOCYTES NFR BLD AUTO: 4.3 % — SIGNIFICANT CHANGE UP (ref 1.7–9.3)
NEUTROPHILS # BLD AUTO: 6.57 K/UL — HIGH (ref 1.4–6.5)
NEUTROPHILS NFR BLD AUTO: 83.1 % — HIGH (ref 42.2–75.2)
NITRITE UR-MCNC: NEGATIVE — SIGNIFICANT CHANGE UP
NRBC # BLD: 0 /100 WBCS — SIGNIFICANT CHANGE UP (ref 0–0)
PCO2 BLDV: 42 MMHG — SIGNIFICANT CHANGE UP (ref 41–51)
PH BLDV: 7.43 — SIGNIFICANT CHANGE UP (ref 7.26–7.43)
PH UR: 7 — SIGNIFICANT CHANGE UP (ref 5–8)
PLATELET # BLD AUTO: 187 K/UL — SIGNIFICANT CHANGE UP (ref 130–400)
PO2 BLDV: 26 MMHG — SIGNIFICANT CHANGE UP (ref 20–40)
POTASSIUM BLDV-SCNC: 4.3 MMOL/L — SIGNIFICANT CHANGE UP (ref 3.3–5.6)
POTASSIUM SERPL-MCNC: 4.6 MMOL/L — SIGNIFICANT CHANGE UP (ref 3.5–5)
POTASSIUM SERPL-SCNC: 4.6 MMOL/L — SIGNIFICANT CHANGE UP (ref 3.5–5)
PROT SERPL-MCNC: 6.6 G/DL — SIGNIFICANT CHANGE UP (ref 6–8)
PROT UR-MCNC: SIGNIFICANT CHANGE UP
RBC # BLD: 4.04 M/UL — LOW (ref 4.7–6.1)
RBC # FLD: 11.8 % — SIGNIFICANT CHANGE UP (ref 11.5–14.5)
RSV RESULT: NEGATIVE — SIGNIFICANT CHANGE UP
RSV RNA RESP QL NAA+PROBE: NEGATIVE — SIGNIFICANT CHANGE UP
SAO2 % BLDV: 51 % — SIGNIFICANT CHANGE UP
SODIUM SERPL-SCNC: 135 MMOL/L — SIGNIFICANT CHANGE UP (ref 135–146)
SP GR SPEC: 1.04 — HIGH (ref 1.01–1.02)
UROBILINOGEN FLD QL: SIGNIFICANT CHANGE UP
WBC # BLD: 7.9 K/UL — SIGNIFICANT CHANGE UP (ref 4.8–10.8)
WBC # FLD AUTO: 7.9 K/UL — SIGNIFICANT CHANGE UP (ref 4.8–10.8)

## 2020-01-01 PROCEDURE — 99291 CRITICAL CARE FIRST HOUR: CPT

## 2020-01-01 PROCEDURE — 74177 CT ABD & PELVIS W/CONTRAST: CPT | Mod: 26

## 2020-01-01 PROCEDURE — 71045 X-RAY EXAM CHEST 1 VIEW: CPT | Mod: 26

## 2020-01-01 PROCEDURE — 93010 ELECTROCARDIOGRAM REPORT: CPT

## 2020-01-01 RX ORDER — DEXTROSE 50 % IN WATER 50 %
12.5 SYRINGE (ML) INTRAVENOUS ONCE
Refills: 0 | Status: DISCONTINUED | OUTPATIENT
Start: 2020-01-01 | End: 2020-01-02

## 2020-01-01 RX ORDER — DEXTROSE 50 % IN WATER 50 %
25 SYRINGE (ML) INTRAVENOUS ONCE
Refills: 0 | Status: DISCONTINUED | OUTPATIENT
Start: 2020-01-01 | End: 2020-01-02

## 2020-01-01 RX ORDER — INFLUENZA VIRUS VACCINE 15; 15; 15; 15 UG/.5ML; UG/.5ML; UG/.5ML; UG/.5ML
0.5 SUSPENSION INTRAMUSCULAR ONCE
Refills: 0 | Status: DISCONTINUED | OUTPATIENT
Start: 2020-01-01 | End: 2020-01-02

## 2020-01-01 RX ORDER — SODIUM CHLORIDE 9 MG/ML
1000 INJECTION, SOLUTION INTRAVENOUS
Refills: 0 | Status: DISCONTINUED | OUTPATIENT
Start: 2020-01-01 | End: 2020-01-02

## 2020-01-01 RX ORDER — INSULIN GLARGINE 100 [IU]/ML
25 INJECTION, SOLUTION SUBCUTANEOUS
Qty: 0 | Refills: 0 | DISCHARGE

## 2020-01-01 RX ORDER — SODIUM CHLORIDE 9 MG/ML
2000 INJECTION, SOLUTION INTRAVENOUS ONCE
Refills: 0 | Status: COMPLETED | OUTPATIENT
Start: 2020-01-01 | End: 2020-01-01

## 2020-01-01 RX ORDER — CHLORHEXIDINE GLUCONATE 213 G/1000ML
1 SOLUTION TOPICAL
Refills: 0 | Status: DISCONTINUED | OUTPATIENT
Start: 2020-01-01 | End: 2020-01-02

## 2020-01-01 RX ORDER — DEXTROSE 50 % IN WATER 50 %
15 SYRINGE (ML) INTRAVENOUS ONCE
Refills: 0 | Status: DISCONTINUED | OUTPATIENT
Start: 2020-01-01 | End: 2020-01-02

## 2020-01-01 RX ORDER — ONDANSETRON 8 MG/1
4 TABLET, FILM COATED ORAL ONCE
Refills: 0 | Status: COMPLETED | OUTPATIENT
Start: 2020-01-01 | End: 2020-01-01

## 2020-01-01 RX ORDER — ACETAMINOPHEN 500 MG
650 TABLET ORAL ONCE
Refills: 0 | Status: COMPLETED | OUTPATIENT
Start: 2020-01-01 | End: 2020-01-01

## 2020-01-01 RX ORDER — INSULIN LISPRO 100/ML
VIAL (ML) SUBCUTANEOUS
Refills: 0 | Status: DISCONTINUED | OUTPATIENT
Start: 2020-01-01 | End: 2020-01-02

## 2020-01-01 RX ORDER — INSULIN GLARGINE 100 [IU]/ML
25 INJECTION, SOLUTION SUBCUTANEOUS AT BEDTIME
Refills: 0 | Status: DISCONTINUED | OUTPATIENT
Start: 2020-01-01 | End: 2020-01-02

## 2020-01-01 RX ORDER — METFORMIN HYDROCHLORIDE 850 MG/1
1 TABLET ORAL
Qty: 0 | Refills: 0 | DISCHARGE

## 2020-01-01 RX ORDER — CEFEPIME 1 G/1
2000 INJECTION, POWDER, FOR SOLUTION INTRAMUSCULAR; INTRAVENOUS EVERY 12 HOURS
Refills: 0 | Status: DISCONTINUED | OUTPATIENT
Start: 2020-01-01 | End: 2020-01-02

## 2020-01-01 RX ORDER — GLUCAGON INJECTION, SOLUTION 0.5 MG/.1ML
1 INJECTION, SOLUTION SUBCUTANEOUS ONCE
Refills: 0 | Status: DISCONTINUED | OUTPATIENT
Start: 2020-01-01 | End: 2020-01-02

## 2020-01-01 RX ADMIN — SODIUM CHLORIDE 2000 MILLILITER(S): 9 INJECTION, SOLUTION INTRAVENOUS at 10:21

## 2020-01-01 RX ADMIN — SODIUM CHLORIDE 2000 MILLILITER(S): 9 INJECTION, SOLUTION INTRAVENOUS at 11:27

## 2020-01-01 RX ADMIN — ONDANSETRON 4 MILLIGRAM(S): 8 TABLET, FILM COATED ORAL at 10:21

## 2020-01-01 RX ADMIN — CEFEPIME 100 MILLIGRAM(S): 1 INJECTION, POWDER, FOR SOLUTION INTRAMUSCULAR; INTRAVENOUS at 17:58

## 2020-01-01 RX ADMIN — Medication 75 MILLIGRAM(S): at 21:29

## 2020-01-01 RX ADMIN — INSULIN GLARGINE 25 UNIT(S): 100 INJECTION, SOLUTION SUBCUTANEOUS at 21:29

## 2020-01-01 RX ADMIN — Medication 650 MILLIGRAM(S): at 13:24

## 2020-01-01 RX ADMIN — Medication 650 MILLIGRAM(S): at 11:44

## 2020-01-01 RX ADMIN — CEFEPIME 100 MILLIGRAM(S): 1 INJECTION, POWDER, FOR SOLUTION INTRAMUSCULAR; INTRAVENOUS at 11:40

## 2020-01-01 NOTE — ED PROVIDER NOTE - OBJECTIVE STATEMENT
39 year old male with a pmh of diabetes presents here c/o abdominal pain x 2 days associated with multiple episodes of vomiting and diarrhea. Abdominal pain local llq is 5/10. Patient also endorses a cough. Denies any fever chills sob cp urinary frequency urgency burning.

## 2020-01-01 NOTE — H&P ADULT - ATTENDING COMMENTS
I saw and examined the patient independently. I agree with above history, physical exam and plan of care which I have reviewed and edited where appropriate with following additions.     patient feels weak/ dizzy on ambulation today morning / oral intake is improving a little.     Influenza A infection:   had temp of 101F today morning.   c/w tamiflu.   received IVF in ED.   will give bolus of 1L fluid.   tylenol prn    dizziness likely due to infection / dehydration:   give IVF 1L bolus and reassess for symptoms later in the day today.    dispo: pending clinical improvement of symptoms with hydration/ possibly home later in the afternoon today.

## 2020-01-01 NOTE — ED ADULT NURSE REASSESSMENT NOTE - NS ED NURSE REASSESS COMMENT FT1
patients vital signs reassessed, CT scan of abdomen and pelvis with IV contrast, results pending, urine sent, will continue to reassess and monitor. Cardiac monitoring maintained.

## 2020-01-01 NOTE — H&P ADULT - HISTORY OF PRESENT ILLNESS
39 year old male with a pmh of diabetes presents here c/o abdominal pain x 2 days associated with multiple episodes of vomiting and diarrhea. Abdominal pain local llq is 5/10. Patient also endorses a cough. Denies any fever chills sob cp urinary frequency urgency burning. 39 year old male with a pmh of diabetes presents here c/o abdominal pain, fever, cough, nausea, diarrhea for several days. Patient initially started to get cough and fatigue three days ago. This was followed by fevers, nausea and then abdominal pain localized in the left lower quadrant, 5/10, dull in character. He also had several episodes of non-bloody diarrhea. Abdominal pain is currently not present during my interview. Denies sputum production, chest pain, skin rash/changes, urinary complaints.

## 2020-01-01 NOTE — ED ADULT NURSE REASSESSMENT NOTE - NS ED NURSE REASSESS COMMENT FT1
patient assessed, patient complains of nausea and abdominal pain. Patient tachycardic, placed on cardiac monitor. Labs sent pending results. Zofran and IV fluids given as per MD orders. Awaiting CT scan of abdomen and pelvis, will continue to monitor.

## 2020-01-01 NOTE — ED ADULT NURSE REASSESSMENT NOTE - NS ED NURSE REASSESS COMMENT FT1
Blood cultures sent, IV Cefepime infusing as per MD orders, Tylenol PO given for fever, will continue to reassess and monitor. Cardiac monitoring maintained.

## 2020-01-01 NOTE — ED PROVIDER NOTE - ATTENDING CONTRIBUTION TO CARE
40 y/o M pmh DM on insulin p/w abd pain, v/d, and cough x2d. + chills.    CONSTITUTIONAL: ill appearing  SKIN: Warm dry  HEAD: NCAT  EYES: NL inspection  ENT: mild dry MM  NECK: Supple; non tender.  CARD: RRR  RESP: CTAB, + dry cough on exam.  ABD: Soft, + ttp L abd, no r/g  EXT: no pedal edema  NEURO: Grossly unremarkable  PSYCH: Cooperative, appropriate    IMP: colitis; sepsis, dka vs hyperglyemia  P: labs, ivf, ekg, cxr, ct abd, abx, blood, urin cx, ua, reassess.

## 2020-01-01 NOTE — ED PROVIDER NOTE - NS ED ROS FT
Constitutional: See HPI.  Eyes: No visual changes,   ENMT: . No neck pain  Cardiac: No cp  Respiratory: + cough  GI: see hpi  : No dysuria, frequency or burning.  MS: No myalgia, muscle weakness, joint pain or back pain.  Psych: No suicidal or homicidal ideations.  Neuro: No headache   Skin: No skin rash.

## 2020-01-01 NOTE — H&P ADULT - NSHPLABSRESULTS_GEN_ALL_CORE
12.0   7.90  )-----------( 187      ( 2020 10:20 )             36.0           135  |  96<L>  |  24<H>  ----------------------------<  435<H>  4.6   |  21  |  0.9    Ca    9.4      2020 10:20    TPro  6.6  /  Alb  3.9  /  TBili  0.4  /  DBili  x   /  AST  29  /  ALT  41  /  AlkPhos  99                Urinalysis Basic - ( 2020 14:10 )    Color: Light Yellow / Appearance: Clear / S.038 / pH: x  Gluc: x / Ketone: Small  / Bili: Negative / Urobili: <2 mg/dL   Blood: x / Protein: Trace / Nitrite: Negative   Leuk Esterase: Negative / RBC: x / WBC x   Sq Epi: x / Non Sq Epi: x / Bacteria: x            Lactate Trend   @ 10:20 Lactate:3.5             CAPILLARY BLOOD GLUCOSE      POCT Blood Glucose.: 210 mg/dL (2020 18:19)        < from: CT Abdomen and Pelvis w/ IV Cont (20 @ 12:51) >    IMPRESSION:   No CT evidence of an acute abdominopelvic pathology.    < end of copied text >

## 2020-01-01 NOTE — ED PROVIDER NOTE - CARE PLAN
Principal Discharge DX:	Influenza A  Secondary Diagnosis:	Vomiting and diarrhea  Secondary Diagnosis:	Sepsis

## 2020-01-01 NOTE — ED PROVIDER NOTE - PHYSICAL EXAMINATION
CONSTITUTIONAL: WA / WN / NAD  HEAD: NCAT  EYES: PERRL; EOMI;   ENT: Normal pharynx; mucous membranes pink/moist, no erythema.  NECK: Supple; no meningeal signs  CARD: tachycardic nl S1/S2; no M/R/G.  RESP: Respiratory rate and effort are normal; breath sounds clear and equal bilaterally.  ABD: Soft, + epigastric & luq ttp  MSK/EXT: No gross deformities; full range of motion.  SKIN: Warm and dry;   NEURO: AAOx3  PSYCH: Memory Intact, Normal Affect CONSTITUTIONAL: WA / WN / NAD  HEAD: NCAT  EYES: PERRL; EOMI;   ENT: Normal pharynx; mucous membranes dry  NECK: Supple; no meningeal signs  CARD: tachycardic nl S1/S2; no M/R/G.  RESP: Respiratory rate and effort are normal; breath sounds clear and equal bilaterally.  ABD: Soft, + epigastric & luq ttp  MSK/EXT: No gross deformities; full range of motion.  SKIN: Warm and dry;   NEURO: AAOx3  PSYCH: Memory Intact, Normal Affect

## 2020-01-01 NOTE — H&P ADULT - ASSESSMENT
#Flu A  - even though most likely >48 hrs since symptom onset given quite symptomatic presentation in this hospitalized patient with diabetes will still start tamiflu for 5 days  - s/p 2 L bolus - vitals WNL  - no evidence of superimposed pneumonia    #Diabetes  - start patient home dose of insulin  - presented hyperglycemic since he did not take home dose of lantus yesterday  - beta-hydroxy negative, pH normal no DKA currently  - anion gap most likely from lactate    #Diet: Carb consist  #DVT ppx: early ambulation  #Activity: ambulate as tolerated

## 2020-01-01 NOTE — ED ADULT TRIAGE NOTE - CHIEF COMPLAINT QUOTE
Pt c/o general body aches & pains, weakness, chills, abdominal pain, nausea x3 days. Pt is diabetic, didn't check FS yesterday and didn't take insulin because he hasn't been able to eat in 2 days.  in triage.

## 2020-01-01 NOTE — ED PROVIDER NOTE - CLINICAL SUMMARY MEDICAL DECISION MAKING FREE TEXT BOX
Pt with fever, v/d, ill appearing.  + episodes HypoTN.  Improved w/ ivf, meds.  CT CXR neg for acute pathology  Labs reveiwd, not in DKA.  FLU A positive.  Pt admitted to medicine for cont treatment for sepsis.

## 2020-01-02 ENCOUNTER — TRANSCRIPTION ENCOUNTER (OUTPATIENT)
Age: 40
End: 2020-01-02

## 2020-01-02 VITALS — DIASTOLIC BLOOD PRESSURE: 70 MMHG | SYSTOLIC BLOOD PRESSURE: 117 MMHG | RESPIRATION RATE: 18 BRPM | TEMPERATURE: 99 F

## 2020-01-02 LAB
ALBUMIN SERPL ELPH-MCNC: 3.3 G/DL — LOW (ref 3.5–5.2)
ALP SERPL-CCNC: 78 U/L — SIGNIFICANT CHANGE UP (ref 30–115)
ALT FLD-CCNC: 24 U/L — SIGNIFICANT CHANGE UP (ref 0–41)
ANION GAP SERPL CALC-SCNC: 16 MMOL/L — HIGH (ref 7–14)
ANION GAP SERPL CALC-SCNC: 17 MMOL/L — HIGH (ref 7–14)
AST SERPL-CCNC: 14 U/L — SIGNIFICANT CHANGE UP (ref 0–41)
BASOPHILS # BLD AUTO: 0 K/UL — SIGNIFICANT CHANGE UP (ref 0–0.2)
BASOPHILS NFR BLD AUTO: 0 % — SIGNIFICANT CHANGE UP (ref 0–1)
BILIRUB SERPL-MCNC: 0.3 MG/DL — SIGNIFICANT CHANGE UP (ref 0.2–1.2)
BUN SERPL-MCNC: 16 MG/DL — SIGNIFICANT CHANGE UP (ref 10–20)
BUN SERPL-MCNC: 17 MG/DL — SIGNIFICANT CHANGE UP (ref 10–20)
CALCIUM SERPL-MCNC: 8.8 MG/DL — SIGNIFICANT CHANGE UP (ref 8.5–10.1)
CALCIUM SERPL-MCNC: 9 MG/DL — SIGNIFICANT CHANGE UP (ref 8.5–10.1)
CHLORIDE SERPL-SCNC: 95 MMOL/L — LOW (ref 98–110)
CHLORIDE SERPL-SCNC: 96 MMOL/L — LOW (ref 98–110)
CO2 SERPL-SCNC: 23 MMOL/L — SIGNIFICANT CHANGE UP (ref 17–32)
CO2 SERPL-SCNC: 24 MMOL/L — SIGNIFICANT CHANGE UP (ref 17–32)
CREAT SERPL-MCNC: 0.7 MG/DL — SIGNIFICANT CHANGE UP (ref 0.7–1.5)
CREAT SERPL-MCNC: 0.7 MG/DL — SIGNIFICANT CHANGE UP (ref 0.7–1.5)
EOSINOPHIL # BLD AUTO: 0 K/UL — SIGNIFICANT CHANGE UP (ref 0–0.7)
EOSINOPHIL NFR BLD AUTO: 0 % — SIGNIFICANT CHANGE UP (ref 0–8)
ESTIMATED AVERAGE GLUCOSE: 240 MG/DL — HIGH (ref 68–114)
GLUCOSE BLDC GLUCOMTR-MCNC: 169 MG/DL — HIGH (ref 70–99)
GLUCOSE BLDC GLUCOMTR-MCNC: 178 MG/DL — HIGH (ref 70–99)
GLUCOSE BLDC GLUCOMTR-MCNC: 215 MG/DL — HIGH (ref 70–99)
GLUCOSE SERPL-MCNC: 193 MG/DL — HIGH (ref 70–99)
GLUCOSE SERPL-MCNC: 234 MG/DL — HIGH (ref 70–99)
HBA1C BLD-MCNC: 10 % — HIGH (ref 4–5.6)
HCT VFR BLD CALC: 31.6 % — LOW (ref 42–52)
HGB BLD-MCNC: 10.7 G/DL — LOW (ref 14–18)
LYMPHOCYTES # BLD AUTO: 0.55 K/UL — LOW (ref 1.2–3.4)
LYMPHOCYTES # BLD AUTO: 7 % — LOW (ref 20.5–51.1)
MAGNESIUM SERPL-MCNC: 1.4 MG/DL — LOW (ref 1.8–2.4)
MANUAL SMEAR VERIFICATION: SIGNIFICANT CHANGE UP
MCHC RBC-ENTMCNC: 29.9 PG — SIGNIFICANT CHANGE UP (ref 27–31)
MCHC RBC-ENTMCNC: 33.9 G/DL — SIGNIFICANT CHANGE UP (ref 32–37)
MCV RBC AUTO: 88.3 FL — SIGNIFICANT CHANGE UP (ref 80–94)
MONOCYTES # BLD AUTO: 0.63 K/UL — HIGH (ref 0.1–0.6)
MONOCYTES NFR BLD AUTO: 8 % — SIGNIFICANT CHANGE UP (ref 1.7–9.3)
NEUTROPHILS # BLD AUTO: 6.72 K/UL — HIGH (ref 1.4–6.5)
NEUTROPHILS NFR BLD AUTO: 81 % — HIGH (ref 42.2–75.2)
NEUTS BAND # BLD: 4 % — SIGNIFICANT CHANGE UP (ref 0–6)
NRBC # BLD: 0 /100 — SIGNIFICANT CHANGE UP (ref 0–0)
NRBC # BLD: SIGNIFICANT CHANGE UP /100 WBCS (ref 0–0)
PLAT MORPH BLD: NORMAL — SIGNIFICANT CHANGE UP
PLATELET # BLD AUTO: 166 K/UL — SIGNIFICANT CHANGE UP (ref 130–400)
POTASSIUM SERPL-MCNC: 4.3 MMOL/L — SIGNIFICANT CHANGE UP (ref 3.5–5)
POTASSIUM SERPL-MCNC: 4.5 MMOL/L — SIGNIFICANT CHANGE UP (ref 3.5–5)
POTASSIUM SERPL-SCNC: 4.3 MMOL/L — SIGNIFICANT CHANGE UP (ref 3.5–5)
POTASSIUM SERPL-SCNC: 4.5 MMOL/L — SIGNIFICANT CHANGE UP (ref 3.5–5)
PROT SERPL-MCNC: 5.8 G/DL — LOW (ref 6–8)
RBC # BLD: 3.58 M/UL — LOW (ref 4.7–6.1)
RBC # FLD: 11.7 % — SIGNIFICANT CHANGE UP (ref 11.5–14.5)
RBC BLD AUTO: NORMAL — SIGNIFICANT CHANGE UP
SODIUM SERPL-SCNC: 135 MMOL/L — SIGNIFICANT CHANGE UP (ref 135–146)
SODIUM SERPL-SCNC: 136 MMOL/L — SIGNIFICANT CHANGE UP (ref 135–146)
WBC # BLD: 7.9 K/UL — SIGNIFICANT CHANGE UP (ref 4.8–10.8)
WBC # FLD AUTO: 7.9 K/UL — SIGNIFICANT CHANGE UP (ref 4.8–10.8)

## 2020-01-02 PROCEDURE — 99236 HOSP IP/OBS SAME DATE HI 85: CPT

## 2020-01-02 RX ORDER — SODIUM CHLORIDE 9 MG/ML
500 INJECTION INTRAMUSCULAR; INTRAVENOUS; SUBCUTANEOUS ONCE
Refills: 0 | Status: COMPLETED | OUTPATIENT
Start: 2020-01-02 | End: 2020-01-02

## 2020-01-02 RX ORDER — ACETAMINOPHEN 500 MG
2 TABLET ORAL
Qty: 0 | Refills: 0 | DISCHARGE
Start: 2020-01-02

## 2020-01-02 RX ORDER — MAGNESIUM SULFATE 500 MG/ML
2 VIAL (ML) INJECTION ONCE
Refills: 0 | Status: COMPLETED | OUTPATIENT
Start: 2020-01-02 | End: 2020-01-02

## 2020-01-02 RX ORDER — SODIUM CHLORIDE 9 MG/ML
1000 INJECTION INTRAMUSCULAR; INTRAVENOUS; SUBCUTANEOUS
Refills: 0 | Status: DISCONTINUED | OUTPATIENT
Start: 2020-01-02 | End: 2020-01-02

## 2020-01-02 RX ORDER — ACETAMINOPHEN 500 MG
650 TABLET ORAL EVERY 6 HOURS
Refills: 0 | Status: DISCONTINUED | OUTPATIENT
Start: 2020-01-02 | End: 2020-01-02

## 2020-01-02 RX ORDER — IBUPROFEN 200 MG
1 TABLET ORAL
Qty: 0 | Refills: 0 | DISCHARGE

## 2020-01-02 RX ADMIN — Medication 75 MILLIGRAM(S): at 05:51

## 2020-01-02 RX ADMIN — SODIUM CHLORIDE 166.67 MILLILITER(S): 9 INJECTION INTRAMUSCULAR; INTRAVENOUS; SUBCUTANEOUS at 13:00

## 2020-01-02 RX ADMIN — Medication 650 MILLIGRAM(S): at 06:32

## 2020-01-02 RX ADMIN — Medication 50 GRAM(S): at 10:45

## 2020-01-02 RX ADMIN — Medication 650 MILLIGRAM(S): at 00:30

## 2020-01-02 RX ADMIN — Medication 1: at 12:53

## 2020-01-02 RX ADMIN — Medication 650 MILLIGRAM(S): at 01:34

## 2020-01-02 RX ADMIN — Medication 75 MILLIGRAM(S): at 17:18

## 2020-01-02 RX ADMIN — Medication 1: at 08:20

## 2020-01-02 RX ADMIN — SODIUM CHLORIDE 500 MILLILITER(S): 9 INJECTION INTRAMUSCULAR; INTRAVENOUS; SUBCUTANEOUS at 14:34

## 2020-01-02 NOTE — DISCHARGE NOTE PROVIDER - CARE PROVIDER_API CALL
Igor Gonzalez (DO)  Internal Medicine  [address] [place] [address] [place] [address]  Phone: [phone]  Fax: [phone]  Follow Up Time: 1 week

## 2020-01-02 NOTE — DISCHARGE NOTE NURSING/CASE MANAGEMENT/SOCIAL WORK - PATIENT PORTAL LINK FT
You can access the FollowMyHealth Patient Portal offered by [facility] by registering at the following website: [email or web address]. By joining [employer]’s FollowMyHealth portal, you will also be able to view your health information using other applications (apps) compatible with our system.

## 2020-01-02 NOTE — DISCHARGE NOTE PROVIDER - NSDCCPCAREPLAN_GEN_ALL_CORE_FT
PRINCIPAL DISCHARGE DIAGNOSIS  Diagnosis: Influenza A  Assessment and Plan of Treatment: hydrate well  rest with 7-9 hours sleep   take Tamiflu for 3 more days   follow up with pmd in 3-5 days      SECONDARY DISCHARGE DIAGNOSES  Diagnosis: DM (diabetes mellitus)  Assessment and Plan of Treatment: continue with medications as before

## 2020-01-02 NOTE — DISCHARGE NOTE PROVIDER - NSDCMRMEDTOKEN_GEN_ALL_CORE_FT
acetaminophen 325 mg oral tablet: 2 tab(s) orally every 6 hours, As needed, Temp greater or equal to 38C (100.4F)  Lantus 100 units/mL subcutaneous solution: 25 unit(s) subcutaneous once a day (at bedtime)  metFORMIN 1000 mg oral tablet: 1 tab(s) orally 2 times a day  Motrin 400 mg oral tablet: 1 tab(s) orally every 6 hours, As Needed fever/ muscle pain   oseltamivir 75 mg oral capsule: 1 cap(s) orally 2 times a day

## 2020-01-02 NOTE — DISCHARGE NOTE PROVIDER - HOSPITAL COURSE
39 year old male with a pmh of diabetes presents here c/o abdominal pain, fever, cough, nausea, diarrhea for several days. Patient initially started to get cough and fatigue three days ago. This was followed by fevers, nausea and then abdominal pain localized in the left lower quadrant, 5/10, dull in character. He also had several episodes of non-bloody diarrhea. Abdominal pain is currently not present during my interview. Denies sputum production, chest pain, skin rash/changes, urinary complaints.        FLU swab :         + influenza A        tamiflu was started         feeling better after receiving Bolus x 2     and now low grade temp         agrees to be d/c home         will c/w tamiflu x 3 more days ( total 5 days )     hydrate well     sleep enough 39 year old male with a pmh of diabetes presents here c/o abdominal pain, fever, cough, nausea, diarrhea for several days. Patient initially started to get cough and fatigue three days ago. This was followed by fevers, nausea and then abdominal pain localized in the left lower quadrant, 5/10, dull in character. He also had several episodes of non-bloody diarrhea. Abdominal pain is currently not present during my interview. Denies sputum production, chest pain, skin rash/changes, urinary complaints.        FLU swab :         + influenza A        tamiflu was started         feeling better after receiving Bolus x 2     and now low grade temp         agrees to be d/c home         will c/w tamiflu x 3 more days ( total 5 days )     hydrate well     sleep enough          Attending Attestation:     Patient seen and examined on day of discharge. recent Vital signs and labs reviewed. patient c/o dizziness on ambulation - improved with IVF hydration. Medrec reviewed and plan of discharge discussed with PA.  clinically stable for discharge with advise to keep himself hydrated/ complete tamiflu at home.

## 2020-01-02 NOTE — CHART NOTE - NSCHARTNOTEFT_GEN_A_CORE
Pt seen ,   feeling better ( received IVF bolus )   dizziness has resolved   all symptoms are better but not resolved     Vital Signs Last 24 Hrs    T(F): 99 (02 Jan 2020 15:49),   HR: 82 (02 Jan 2020 07:42) (82 - 96)  BP: 117/70 (02 Jan 2020 15:49) (105/62 - 117/70)  BP(mean): 85 (01 Jan 2020 17:29) (85 - 85)  RR: 18 (02 Jan 2020 15:49) (17 - 18)  SpO2: 97% (02 Jan 2020 07:42) (96% - 99%)    PE:  chest: cta b/l  cardio: s1s2 heard  abd: Bs+ NT     a/p    influenza A    - agrees on being d/c with tamiflu x 3 more days   - increased rest and hydration   - motrin / apap prn   - f/u with PMD in 1 week     d/w dr. Zhou

## 2020-01-03 LAB
CULTURE RESULTS: NO GROWTH — SIGNIFICANT CHANGE UP
SPECIMEN SOURCE: SIGNIFICANT CHANGE UP

## 2020-01-06 LAB
CULTURE RESULTS: SIGNIFICANT CHANGE UP
CULTURE RESULTS: SIGNIFICANT CHANGE UP
SPECIMEN SOURCE: SIGNIFICANT CHANGE UP
SPECIMEN SOURCE: SIGNIFICANT CHANGE UP

## 2020-01-08 DIAGNOSIS — R19.7 DIARRHEA, UNSPECIFIED: ICD-10-CM

## 2020-01-08 DIAGNOSIS — J10.1 INFLUENZA DUE TO OTHER IDENTIFIED INFLUENZA VIRUS WITH OTHER RESPIRATORY MANIFESTATIONS: ICD-10-CM

## 2020-01-08 DIAGNOSIS — Z79.4 LONG TERM (CURRENT) USE OF INSULIN: ICD-10-CM

## 2020-01-08 DIAGNOSIS — E11.65 TYPE 2 DIABETES MELLITUS WITH HYPERGLYCEMIA: ICD-10-CM

## 2020-01-08 DIAGNOSIS — F17.210 NICOTINE DEPENDENCE, CIGARETTES, UNCOMPLICATED: ICD-10-CM

## 2020-08-14 NOTE — DISCHARGE NOTE ADULT - NSCORESITESY/N_GEN_A_CORE_RD
No
YOU WERE SEEN IN THE ED FOR: alcohol intoxication    YOU WERE PROVIDED RESOURCES FOR A SANE EXAM. YOU DECLINED AN EXAM TODAY. IF YOU DO DEICIDE TO HAVE THE EXAM, PLEASE DO NOT SHOWER TO PRESERVE EVIDENCE. DO NOT THROW OUT YOUR BELONGINGS. PLEASE RETURN WITHIN 72 HOURS IF YOU CHANGE YOUR MIND.    YOU DECIDED TO LEAVE AGAINST MEDICAL ADVICE. AFTER AN EXTENSIVE DISCUSSION, YOU ARE WILLING TO ACCEPT THE RISKS VERBALIZED TO YOU. OF NOTE, YOU ARE AWARE OF THE RISKS OF ALCOHOL WITHDRAWAL, SEIZURES, HALLUCINATIONS, PERMANENT DISABILITY, AND DEATH.    PLEASE FOLLOW UP WITH YOUR PRIVATE PHYSICIAN WITHIN THE NEXT 72 HOURS. BRING COPIES OF YOUR RESULTS.    RETURN TO THE EMERGENCY DEPARTMENT IF YOU EXPERIENCE ANY NEW/CONCERNING/WORSENING SYMPTOMS SUCH AS BUT NOT LIMITED TO:  -tremors  -nausea  -vomiting  -hallucinations  -thoughts of harming yourself or others  -intense sweating and anxiety  -chest pain  -shortness of breath

## 2020-08-22 NOTE — ED PROVIDER NOTE - PRINCIPAL DIAGNOSIS
Re:  Ben Keyla up visit     8/22/2020 1:52 PM    SSN: [id number]    Subjective:   Virgen Mares was seen on follow up. No acute changes overnight. She is still mute. There is some movement over the right upper and lower extremities. CT scan of the brain from today did not show any hemorrhage; has shown slight mass-effect. Patient continued on heparin drip.       Medications:    Current Facility-Administered Medications   Medication Dose Route Frequency Provider Last Rate Last Dose    dextrose 5% with KCl 20 mEq/L infusion   IntraVENous CONTINUOUS Cassie Cee MD 75 mL/hr at 08/21/20 0533      heparin (porcine) 25,000 units in 0.45% saline 250 ml infusion  18-36 Units/kg/hr IntraVENous TITRATE Cassie Cee MD 13.7 mL/hr at 08/22/20 0649 22 Units/kg/hr at 08/22/20 0649    piperacillin-tazobactam (ZOSYN) 3.375 g in 0.9% sodium chloride (MBP/ADV) 100 mL MBP  3.375 g IntraVENous Q6H Haseeb DIAS  mL/hr at 08/22/20 1142 3.375 g at 08/22/20 1142    albuterol-ipratropium (DUO-NEB) 2.5 MG-0.5 MG/3 ML  3 mL Nebulization Q4H RT Cassie Cee MD   Stopped at 08/21/20 0800    aspirin (ASA) suppository 300 mg  300 mg Rectal DAILY Frannie Rivera K, DO   300 mg at 08/21/20 0900    sodium chloride (NS) flush 5-40 mL  5-40 mL IntraVENous Q8H Henry Zhang MD   10 mL at 08/22/20 0600    sodium chloride (NS) flush 5-40 mL  5-40 mL IntraVENous PRN Tanmay Khanna MD        docusate sodium (COLACE) capsule 100 mg  100 mg Oral BID PRN Tanmay Khanna MD        acetaminophen (TYLENOL) suppository 650 mg  650 mg Rectal Q4H PRN Tanmay Khanna MD        labetaloL (NORMODYNE;TRANDATE) 20 mg/4 mL (5 mg/mL) injection 5 mg  5 mg IntraVENous Q10MIN PRN Henry Zhang MD   5 mg at 08/20/20 1218    zinc sulfate (ZINCATE) 220 (50) mg capsule 1 Cap  1 Cap Oral DAILY Henry Zhang MD Stopped at 08/20/20 0900    melatonin tablet 3 mg  3 mg Oral QHS Rukhsana Cheema MD   Stopped at 08/19/20 2200    ascorbic acid (vitamin C) (VITAMIN C) tablet 500 mg  500 mg Oral DAILY Rukhsana Cheema MD   Stopped at 08/20/20 0900    atorvastatin (LIPITOR) tablet 80 mg  80 mg Oral QHS Warden August DO   Stopped at 08/19/20 2200       Vital signs:    Visit Vitals  BP (!) 151/98 (BP 1 Location: Left leg, BP Patient Position: At rest)   Pulse 82   Temp 97.1 °F (36.2 °C)   Resp 22   Ht 5' 3\" (1.6 m)   Wt 72 kg (158 lb 11.2 oz)   SpO2 95%   BMI 28.11 kg/m²       Review of Systems:   Unable to obtain due to her clinical condition. Patient Active Problem List   Diagnosis Code    Cellulitis L03.90    Elevated troponin I level R79.89    Cerebrovascular accident (CVA) ([facility] [address] I63.9    CVA (cerebral vascular accident) ([facility] [address] I63.9         Objective:   GENERAL:                  In no apparent distress. EXTREMITIES:           Slightly increased right lower extremity tone. No movement on the right upper or lower extremity. HEAD:                         Ear, nose, and throat appear to be without trauma. The patient is normocephalic.     NEUROLOGIC EXAMINATION    Mental status: Awake, alert, not answering any orientation questions; also follows simple midline commands; no gaze deviation. Speech and languge: Mute; impaired comprehension. CN: BTT only from the left side;  EOMI, PERRLA, right lower facial palsy, palate elevation symmetric bilat, tongue midline  Motor: Slightly decreased tone over the right upper extremity and slightly increased tone over the right lower extremity; strength is on the right side 0/5; left side 5 out of 5. Sensory: Responds to pain from all sites. Coordination: Unable to assess.   DTR: 2+ throughout, upgoing plantar on the right  Gait: not tested     Result Information      Status: Final result (Exam End: 8/19/2020 16:02)  Provider Status: Open    Study Result      EXAM: CT HEAD WO CONT     CLINICAL INDICATION/HISTORY: question CVA, no movement right side, down x 4 days     TECHNIQUE: Contiguous axial images were obtained through the brain.  From these,  sagittal and coronal reconstructions were generated.     CT scans at this facility are performed using dose optimization technique as  appropriate with performed exam, to include automated exposure control,  adjustment of mA and/or kV according to patient's size (including appropriate  matching for site-specific examinations), or use of iterative reconstruction  technique.     COMPARISON: None     FINDINGS:           Soft tissues: No significant findings.     Skull base/calvarium: Unremarkable.     Brain: There is a nonhemorrhagic acute to subacute infarction on the left. In  the axial plane involving an area of about 5 x 5 cm. There is mass effect on the  left lateral ventricle there is mild 3 to 4 mm midline shift.     No evidence of hemorrhagic conversion is seen.     There is a tiny mary of calcific density in the medial and the tiny increased  density in the posterior margins of the infarcted parenchyma suggesting  possibility of some distal calcific embolus see image 12 series 2. Some of the  density may represent stagnant or thrombosed blood within MCA territory branch  arteries.     This appears to be involving the cortex as well as the left basal ganglia  thalamic region and globus pallidus. Putamen is involved along its posterior  aspect.   There is evidence for some indeterminate probably old infarction in the right  anterior basal ganglia and thalamus seen on image 13 series 2  Ventricles and cisterns: No intraluminal bleed is seen     Orbits: Unremarkable.      Paranasal Sinuses: Clear      Other findings: None     IMPRESSION  IMPRESSION[de-identified]     Acute/subacute large nonhemorrhagic left MCA territory infarction as described     Small calcific density in the medial portion may represent some calcified  embolic material. Densities along the posterior aspect of the infarcted area may  represent some thrombosed vessels.      Result Information      Status: Final result (Exam End: 8/19/2020 17:02)  Provider Status: Open    Study Result      EXAM: CTA HEAD NECK W WO CONT     CLINICAL INDICATIONS: Eval for LVO     TECHNIQUE: Helical CT scan of the brain and neck were performed at 1.25 mm  intervals during rapid IV bolus contrast administration.  These data were  reconstructed at .625 mm intervals for vascular analysis.  The data was also  reviewed at 2.5 mm intervals for accompanying soft tissue analysis. 3D post  processed images, including surface shaded displays, were produced for this exam  on independent console, permanently archived and interpreted.     All CT scans at this facility are performed using dose optimization technique as  appropriate to a performed exam, to include automated exposure control,  adjustment of the MA and/or kV according to patient size (including appropriate  matching for site-specific examinations) or use of  iterative reconstruction  technique.     CONTRAST: Isovue 370     COMPARISON: None     CTA SOFT TISSUE ANALYSIS:  Lungs: Clear infiltrate on the right side moderate burden q.d. pulmonary embolus  identified in the vessels on the left side. Upper chest: Unremarkable  Neck: Unremarkable  Lymph nodes: No adenopathy  Orbits: Unremarkable  Paranasal sinuses: Clear  Brain: Evidence for a moderate to large left MCA territory infarction. The  distal branches appear to have recannulized. There is no evidence of any  proximal vessel cut off to the infarcted area on the left. Bones: Unremarkable for age.     CTA NECK VASCULAR ANALYSIS:      Aortic arch: Left sided with typical configuration.     Innominate: Patent     Right Subclavian: Patent  Left Subclavian: Patent     Right carotid:  -CCA: Patent  -ECA: Patent  -ICA: Patent.  Estimated % stenosis of proximal ICA by NASCET criteria.     Left carotid:  -CCA: Patent  -ECA: Patent  -ICA: Patent Estimated % stenosis of proximal ICA by NASCET criteria.     Right vertebral: Patent     Left vertebral: Patent        CTA BRAIN VASCULAR ANALYSIS:      Right anterior circulation:  -ICA: Patent  -JEFFERY: Patent   -MCA: Patent     Left anterior circulation:  -ICA: Patent  -JEFFERY: Patent   -MCA: Patent     -ACOM: Unremarkable  -PCOM: Patent     Posterior circulation:  -RVA: Patent  -LVA: Patent  -Basilar: Patent  -Right PCA: Patent  -Left PCA: Patent     Major dural venous sinuses: Unremarkable     Other:     There is evidence for moderate burden PE on the left side with extension  secondary and tertiary branches into the upper lobe. The entire lung is not  included here. The main pulmonary artery and right pulmonary artery are. These  are free of any additional defects.        IMPRESSION  Impression:     1. Patent intra- and extracranial brain arteries. Vessels surrounding the  infarcted area in the left frontoparietal region have recannulized. No evidence  of any proximal vessel cut off to the infarcted region is identified in the  proximal M1 and M2 segments     Evidence for at least left-sided moderate sized acute pulmonary embolism.           Result Information     Status: Final result (Exam End: 8/22/2020 08:28)  Provider Status: Open    Study Result     NONCONTRAST HEAD CT     CPT CODE: 59947     INDICATION: Above. Altered mental status.  Recent CVA.     COMPARISON: 8/19/2020     TECHNIQUE: Serial axial CT images through the brain were obtained without  administration of intravenous contrast. Additional coronal and sagittal  reformation images were also performed.     All CT scans at this facility are performed using dose optimization technique as  appropriate to the performed exam, to include automated exposure control,  adjustment of the mA and/or kV according to patient's size (Including  appropriate matching for site-specific examinations), or use of iterative  reconstruction technique.     FINDINGS:     The head is mildly tilted. Streak artifacts noted.     Again seen is previously described rather large region of hypoattenuation in the  left MCA territory consistent with recent stroke. There is evidence of residual  edema with effacement of the cortical sulci and left lateral ventricle, and  approximately 4 mm left-to-right midline shift, similar to prior.     No evidence of acute intracranial hemorrhage. No evidence of hydrocephalus.     No mass lesion identified.       No evidence of new acute ischemic stroke/ cerebrovascular accident (CVA) since  the prior head CT is identified. Head CT is often insensitive early to acute  ischemic stroke.      The visualized portions of the paranasal sinuses demonstrate no significant  mucosal pathology. The mastoid air cells are aerated  The calvarium appears  intact.     IMPRESSION  Impression:      Rather large subacute/evolving left MCA territory stroke is again seen as  described previously with associated edema and mild mass effect similar to  prior.  No evidence of hemorrhagic conversion.     No acute CT findings are seen compared to the prior head CT.         CBC:   Lab Results   Component Value Date/Time    WBC 20.5 (H) 08/22/2020 03:22 AM    RBC 4.85 08/22/2020 03:22 AM    HGB 13.7 08/22/2020 03:22 AM    HCT 42.1 08/22/2020 03:22 AM    PLATELET 722 10/64/4138 03:22 AM     BMP:   Lab Results   Component Value Date/Time    Glucose 91 08/22/2020 03:22 AM    Sodium 145 08/22/2020 03:22 AM    Potassium 3.4 (L) 08/22/2020 03:22 AM    Chloride 111 08/22/2020 03:22 AM    CO2 29 08/22/2020 03:22 AM    BUN 22 (H) 08/22/2020 03:22 AM    Creatinine 1.20 08/22/2020 03:22 AM    Calcium 8.6 08/22/2020 03:22 AM     CMP:   Lab Results   Component Value Date/Time    Glucose 91 08/22/2020 03:22 AM    Sodium 145 08/22/2020 03:22 AM    Potassium 3.4 (L) 08/22/2020 03:22 AM    Chloride 111 08/22/2020 03:22 AM    CO2 29 08/22/2020 03:22 AM    BUN 22 (H) 08/22/2020 03:22 AM    Creatinine 1.20 08/22/2020 03:22 AM    Calcium 8.6 08/22/2020 03:22 AM    Anion gap 5 08/22/2020 03:22 AM    BUN/Creatinine ratio 18 08/22/2020 03:22 AM    Alk. phosphatase 117 08/19/2020 03:37 PM    Protein, total 7.8 08/19/2020 03:37 PM    Albumin 3.5 08/19/2020 03:37 PM    Globulin 4.3 (H) 08/19/2020 03:37 PM    A-G Ratio 0.8 08/19/2020 03:37 PM     Coagulation:   Lab Results   Component Value Date/Time    Prothrombin time 15.3 (H) 08/19/2020 03:37 PM    INR 1.2 08/19/2020 03:37 PM    aPTT 67.9 (H) 08/22/2020 03:22 AM       Impression:      A 56years old female patient was brought to the emergency room after she was found down at her home of unknown duration [she was last seen about 4 days ago]. She has global aphasia with right-sided body weakness. Otherwise she is awake. No witnessed seizure. CT scan of the brain showed a large left MCA territory stroke with no hemorrhage. No previous history of stroke on the chart. No atrial fibrillation on telemetry. CT angiography of the head and neck was unremarkable. Incidental finding of left side pulmonary embolism. Doppler ultrasound of the lower extremities showed occlusive thrombus of the right proximal and distal deep veins. Patient had an inferior vena cava filter. She is on heparin drip with no bolusing.     Plan:  -Continuous telemetry/up with assistance  -Vitals/Neurochecks q4hrs  -MRI Brain w/o contrast  -TTE: pending  -Follow the CTA of chest  -Management of pulmonary embolism: s/p IVC filter. On heparin drip currently;  anticoagulation in this patient with large MCA territory acute infarction is difficult. At high risk of hemorrhagic conversion.    -Need to follow the for clinical worsening and serial CT scans.  -Start on ASA 81mg Qdaily and Atorvastatin 80mg Qdaily  -PT/OT/ST    Sincerely,      Juan Miguel Jacobo M.D.         PLEASE NOTE:   This document has been produced using voice recognition software. Unrecognized errors in transcription may be present. Facial swelling

## 2020-12-21 PROBLEM — Z87.09 HISTORY OF ACUTE SINUSITIS: Status: RESOLVED | Noted: 2019-01-02 | Resolved: 2020-12-21

## 2021-01-02 NOTE — ED ADULT NURSE NOTE - CAS TRG GENERAL NORM CIRC DET
Health Information Management Services               Recipient:  [facility]        Sender:  Lucy Galeana, [facility] [phone]        Date: January 12, 2021  Patient Name:  Willy Harrell  Routing Message:    Discharge Orders        The documents accompanying this notice contain confidential information belonging to the sender.  This information is intended only for the use of the individual or entity named above.  The authorized recipient of this information is prohibited from disclosing this information to any other party and is required to destroy the information after its stated need has been fulfilled, unless otherwise required by state law.      If you are not the intended recipient, you are hereby notified that any disclosure, copy, distribution or action taken in reliance on the contents of these documents is strictly prohibited.  If you have received this document in error, please return it by fax to [phone] with a note on the cover sheet explaining why you are returning it (e.g. not your patient, not your provider, etc.).  If you need further assistance, please call [facility] Centralized Transcription at [phone].  Documents may also be returned by mail to [facility] [email or web address], , [address], [place], [place] [address].         Strong peripheral pulses

## 2021-06-29 NOTE — PHYSICAL THERAPY INITIAL EVALUATION ADULT - THERAPY FREQUENCY, PT EVAL
Progress Notes by Giacomo Hewitt MD at 8/21/2020  1:00 PM     Author: Giacomo Hewitt MD Service: -- Author Type: Physician    Filed: 8/27/2020  2:11 PM Encounter Date: 8/21/2020 Status: Signed    : Giacomo Hewitt MD (Physician)             HPI:  This is a 55 y.o. male here today with concerns of pain and bulging in his right groin area. He has noted this for the past few month(s). The symptoms have progressed and increased over this time. He comes in for evaluation secondary to the hernia causing enough issues to bother them with daily activities or chores.    Allergies:Penicillins    Past Medical History:   Diagnosis Date   ? Biliary tract cancer (H)    ? Hyperlipidemia    ? Sleep apnea     Does not have to use CPAP   ? Testicular cancer (H)        Past Surgical History:   Procedure Laterality Date   ? ARTHROSCOPIC REPAIR ACL     ? ERCP N/A 9/4/2015    Procedure: ENDOSCOPIC RETROGRADE CHOLANGIOPANCREATOGRAPHY with sphincterotomy, cytology brushing and stent placement ;  Surgeon: Gopi Hudson MD;  Location: [facility];  Service:    ? KNEE ARTHROSCOPY     ? CO EDG US EXAM SURGICAL ALTER STOM DUODENUM/JEJUNUM N/A 9/14/2015    Procedure: ENDOSCOPIC ULTRASOUND WITH FINE NEEDLE ASPIRATION;  Surgeon: Denton Mckenzie MD;  Location: [facility];  Service: Gastroenterology   ? TESTICLE SURGERY     ? WHIPPLE PROCEDURE W/ LAPAROSCOPY     ? WISDOM TOOTH EXTRACTION         CURRENT MEDS:      Family History   Problem Relation Age of Onset   ? Peripheral vascular disease Mother    ? Dementia Mother    ? Stroke Mother    ? Skin cancer Father    ? Kidney disease Sister    ? Colon cancer Maternal Grandfather    ? Cancer Paternal Grandfather         reports that he has never smoked. He has never used smokeless tobacco. He reports current alcohol use. He reports that he does not use drugs.    Review of Systems - Negative except right inguinal pain bulge     Vitals:    08/21/20 1303   BP: 136/86   Patient Site:  "Right Arm   Patient Position: Sitting   Cuff Size: Adult Large   Weight: 203 lb (92.1 kg)   Height: 5' 9\" (1.753 m)       Body mass index is 29.98 kg/m .    EXAM:  General: NAD   HEENT: normocephalic, PERRLA and EOMS intact  Mounth: Mucus membranes moist  Neck: Supple  Chest: Clear to auscultation bilaterally  CV: RRR  ABD: Soft nontender and nondistended, right inguinal hernia, reducible  EXT: Warm, pulses intact,   Neuro: Alert and oriented x3  Back: no CVA tenderness      Assessment/Plan: Pt with a right inguinal hernia. I discussed this at length with He.  I went over conservative management as well as surgical treatment of this.. I would plan on doing this via anlaparoscopic  approach with possible use of mesh. I went over the small risks of surgery including but not limited to bleeding and infection, anesthesia, recurrence rates and nerve injury. I discussed the expected recovery time as well. Will get this scheduled. He will contact us to have this scheduled.      MD Giacomo Treadwell MD, MD  General Surgery [phone]  Vascular Surgery [phone]           " 3-5x/week

## 2023-01-27 NOTE — ED PROVIDER NOTE - NS_EDPROVIDERDISPOUSERTYPE_ED_A_ED
Progress Note - NICU   Baby Vipin Webb 5 wk  o  male MRN: [id number]  Unit/Bed#: NICU OVR 04 Encounter: [id number]      Patient Active Problem List   Diagnosis   •  , gestational age 34 completed weeks   • Temperature instability in    • Slow feeding in    • G6PD deficiency       Subjective/Objective     SUBJECTIVE: Baby Vipin Carlson is now 39 days old, currently adjusted at 35w 1d weeks gestation  Gained 50 grams  Tolerating feeds  Improving PO intake  Voiding and stooling  Stable vital signs with occasional tachycardia  OBJECTIVE:     Vitals:   BP (!) 77/39 (BP Location: Right leg)   Pulse 154   Temp 98 4 °F (36 9 °C) (Axillary)   Resp 30   Ht 17 72" (45 cm)   Wt 2320 g (5 lb 1 8 oz)   HC 31 5 cm (12 4")   SpO2 97%   BMI 11 46 kg/m²   51 %ile (Z= 0 02) based on Cecilia (Boys, 22-50 Weeks) head circumference-for-age based on Head Circumference recorded on 2023  Weight change: 50 g (1 8 oz)    I/O:  I/O        0701   0700  0701   0700  0701   0700    P  O  281 177 67    Feedings 77 111 71    Total Intake(mL/kg) 358 (157 36) 288 (126 87) 138 (60 79)    Net +358 +288 +138           Unmeasured Urine Occurrence 11 x 7 x 3 x    Unmeasured Stool Occurrence 11 x 7 x 3 x            Feeding:        FEEDING TYPE: Feeding Type: Breast milk    BREASTMILK ALIDA/OZ (IF FORTIFIED): Breast Milk alida/oz: 24 Kcal   FORTIFICATION (IF ANY): Fortification of Breast Milk/Formula: hhmf   FEEDING ROUTE: Feeding Route: Bottle, NG tube   WRITTEN FEEDING VOLUME: Breast Milk Dose (ml): 46 mL   LAST FEEDING VOLUME GIVEN PO: Breast Milk - P O  (mL): 18 mL   LAST FEEDING VOLUME GIVEN NG: Breast Milk - Tube (mL): 42 mL       IVF: none      Respiratory settings:   Room Air            ABD events: 0 ABDs, 0 self resolved, 0 stimulation    Current Facility-Administered Medications   Medication Dose Route Frequency Provider Last Rate Last Admin   • cholecalciferol (VITAMIN D) oral liquid 400 Units  400 Units Oral Daily Araceli Garzon MD   400 Units at 01/27/23 0831   • [START ON 1/30/2023] cyclopentolate-phenylephrine (CYCLOMYDRIL) 0 2-1 % ophthalmic solution 1 drop  1 drop Both Eyes Q5 Min Araceli Garzon MD       • ferrous sulfate (MARIANN-IN-SOL) oral solution 4 5 mg of iron  2 mg/kg of iron Oral Q24H Rad Estevez MD   4 5 mg of iron at 01/27/23 0831   • sucrose 24 % oral solution 1 mL  1 mL Oral Q5 Min PRN Araceli Garzon MD       • [START ON 1/30/2023] tetracaine 0 5 % ophthalmic solution 1 drop  1 drop Both Eyes Once Araceli Garzon MD           Physical Exam:   General Appearance:  Alert, active, no distress  Head:  Normocephalic, AFOF                             Eyes:  Conjunctiva clear  Ears:  Normally placed, no anomalies  Nose: Nares patent                 Respiratory:  No grunting, flaring, retractions, breath sounds clear and equal    Cardiovascular:  Regular rate and rhythm  No murmur  Adequate perfusion/capillary refill  Abdomen:   Soft, non-distended, no masses, bowel sounds present  Genitourinary:  Normal genitalia, circ healing well, no active bleeding  Musculoskeletal:  Moves all extremities equally  Skin/Hair/Nails:   Skin warm, dry, and intact, no rashes               Neurologic:   Normal tone and reflexes    ----------------------------------------------------------------------------------------------------------------------  IMAGING/LABS/OTHER TESTS    Lab Results:   Recent Results (from the past 24 hour(s))   Hematocrit    Collection Time: 01/26/23 10:46 AM   Result Value Ref Range    Hematocrit 25 8 (L) 30 0 - 45 0 %       Imaging: No results found      Other Studies: none    ----------------------------------------------------------------------------------------------------------------------    Assessment/Plan:    GESTATIONAL AGE:   Baby Boy Webb (Kayla) is a 1620 g (3 lb 9 1 oz) born to a 32 y o   G 2 P 0 mother with an EFREN of 3/2/2023 for  labor with concern for abruption with NRFHR born by   Infant needed CPAP in DR      Initial  screen resulted on 22: presumptive (+)G6PD, elevated TSH with normal T4, Barts Hgb (parents aware)  Repeat NBS resulted on 23: presumptive + G6PD; serum G6PD (low at 49)    CBC was normal      23:  TFTs were WNL  23   TSH = 1 663  ( Normal )     23: Circ done      A - Temp stable in an open crib      - Requires intensive monitoring for prematurity       PLAN:  - Monitor temperatures  - Speech/PT consulted  - Ophthalmology consult per protocol  - Routine pre-discharge screenings including car seat test       RESPIRATORY: RDS ( resolved )  Baby required CPAP in the DR >>> Initially admitted to NICU on PEEP(5), FiO2 at 25%, but was able to wean quickly to 21%, CXR with RDS,  VBG = 7 33 / 39 / 39 / -5  Caffeine citrate was started empirically on initial [facility] NICU admission  Off Caffeine Citrate on 23, at 34 weeks EGA      23:  Weaned to room air at 32 wk CGA      A - Stable in RA     - Last Caffeine dose was 23      - Baby has been event free      PLAN:  - Monitor on room air   - Monitor for apnea off of caffeine   - Goal saturations > 90%      CARDIAC:   Hemodynamic stable, Central UVC placed for access >>> UVC Removed 22: Baby noted to have an irregular heart beat with suspected PACs on the monitor  EKG was normal    Cardiologist recommend ECHO if further clinical concerns     23: Premature atrial and premature ventricular complex noted on monitor    23: ECHO: normal cardiac anatomy / function, mild left atrial dilation, normal biventricular size and systolic function                (mother aware of results)      PLAN:  - Monitor clinically  - F/u with Peds cardiology      FEN/GI: [facility] feeding BrM fortified to 24 sam/oz, 40ml q3h, adjusted for weight to 42ml q3h    Feedings were all via NG over 60 minutes per feed due to h/o emesis       Feedings placed on hold beginnind 8pm, 1/16/23 after baby developed abdominal distension with girth increased 1 5cm        Placed NPO on 1/16 due to concerns for ileus  Sepsis workup done, antibiotics started  Replogle placed to LCWS  V45sSLE at 140mkd initiated  AM KUB - retained stools  PM KUB - continued retained stools, thickened intestinal walls, (+) mottled lucensies, could not exclude pneumatosis  Baby received a glycerine suppository and resumed normal stooling      1/17/23 AM AXR - much better, previously seen thickened bowel walls not seen on this film, no free air, no obvious pneumatosis,minimal stools visible  "Previously seen lucencies persist however appear less numerous compared to prior "  Replogle removed       1/18/23  BMP within normal limits          A - Tolerating feeds with benign abdomen      - Feeding BrM( 24kca/oz ) w/ HMF      - Total feeding goal of 150-160 ml/kg/day      - PO = 62%      - Using Dr Nasim Manzanares Nipple for feeds as recommended by Speech Therapy      - Requires intensive monitoring for risk of hypoglycemia, and nutritional deficiency        - High probability of life threatening clinical deterioration in infant's condition without treatment       PLAN:  - Continue feeds of EBM fortified to 24kcal with HHMF, PO 62%  - Feeding goal of 150-160 ml/kg/day  - Speech consult as needed for PO feeding skills   - Monitor weight  - Encourage maternal lactation  - Continue Vitamin D / Fe     ID:   Suspected Sepsis ( Ruled Out )  Rule Out UTI  Mother GBS unknown presented with PTL  On initial admission at [facility] sent for culture and iv antibiotics started  Received 48h of amp/gent  Blood culture negative at 5 days      As above, BCX and UCX sent and IV abx started on 1/16/23 ( DOL#26 ) due to abdominal distention, with questionable x-ray findings  No definite NEC  Received 36 hrs of vancomycin, gentamicin and flagyl   Blood culture negative for 48 hrs      On 1/21/23 - Blood culture had been negative x 5 days, but clean catch urine culture found to be POSITIVE for Serratia     Infant remained clinically well  Repeat Urine culture via sterile cath obtained  Urine culture negative final 1/22      PLAN:  - Monitor clinically     HEME:   Anemia of Prematurity  Mother with concern for bleed prior to delivery  Infant's [facility] admission CBC showed wbc 6 7, hb/hct 14/43, plt 241 k   1/10/23 H/H was 11/33  1/18/23 Hct 27 8, low, saturations wnl in room air  1/26/23 Hematocrit 26     1/26/23 discussed case with Dr Marilyn Friedman from [address] pediatric hematology/oncology  He discussed it with his attending and recommended PRBC transfusion if infant symptomatic  Recommended follow up as outpatient with their office and to educate mother to avoid the list of causes of hemolysis due to G6PD  No confirmatory testing is needed at this time or before blood transfusion  Likely anemia of prematurity  There was no indication of active hemolysis given no jaundice and gradual drop in hematocrit over several weeks  A - Anemia of Prematurity      - Baby hempdynamically stable  - Retic was 5 56 on 1/16/23     - Baby is on On Yrn-in-sol      - Requires intensive monitoring for anemia      PLAN:  - Monitor clinically  - anemia of prematurity    - Will repeat hematocrit and retic count next week  - Trend Hct on CBG, CBC periodically  - Continue Fe 2 mg/kg/daily      JAUNDICE (resolved):   Mom O pos, Ab neg   Baby B pos, ALONDRA/Karine neg  Required phototherapy from 12/24/23 to 12/25/23, with spontaneous decline 12/27/23         ROP: at risk of ROP, needs evaluation  1/16/23 ROP screening:  • Right eye- stage 0, zone 2  • Left eye- stage 0, zone 2    • no Plus disease in either eye      PLAN:  - Peds Ophtho Follow up in 2 weeks - due 1/31/2023     NEURO: normal exam  At risk of IVH  10/27/23: Head US: normal, no IVH   1/20 HUS - Repeat 30 day - normal       PLAN:  - Monitor clinically  - Speech, OT/PT consulted      SOCIAL: Parents involved  Father at delivery      Plan:  - F/u with CM for family needs   Jessica Rodriguez informed about current condition and plans  Attending Attestation (For Attendings USE Only)...

## 2024-01-01 NOTE — DISCHARGE NOTE ADULT - PATIENT PORTAL LINK FT
You can access the [facility] Patient Portal, offered by [facility], by registering with the following website: [email or web address]
stated

## 2024-03-21 NOTE — DISCHARGE NOTE NURSING/CASE MANAGEMENT/SOCIAL WORK - NSTRANSFERBELONGINGSRESP_GEN_A_NUR
I reviewed the History and Physical, I examined the patient and there are no changes in the patient's condition.  Risks of colon perforation: , Bleeding, possible use of titanium stainless steel clips, possible need for colectomy and possible need for transfer to an inpatient hospital status depending on the outcome of a potential difficult polypectomy was all discussed at length with the patient she wishes to proceed at this time.   yes

## 2025-02-27 ENCOUNTER — INPATIENT (INPATIENT)
Facility: HOSPITAL | Age: 45
LOS: 4 days | Discharge: HOME HEALTH | DRG: 420 | End: 2025-03-04
Attending: INTERNAL MEDICINE | Admitting: INTERNAL MEDICINE
Payer: MEDICAID

## 2025-02-27 VITALS
TEMPERATURE: 98 F | RESPIRATION RATE: 16 BRPM | DIASTOLIC BLOOD PRESSURE: 69 MMHG | OXYGEN SATURATION: 98 % | HEART RATE: 83 BPM | SYSTOLIC BLOOD PRESSURE: 113 MMHG

## 2025-02-27 DIAGNOSIS — Z79.4 LONG TERM (CURRENT) USE OF INSULIN: ICD-10-CM

## 2025-02-27 LAB
ALBUMIN SERPL ELPH-MCNC: 4.4 G/DL — SIGNIFICANT CHANGE UP (ref 3.5–5.2)
ALP SERPL-CCNC: 220 U/L — HIGH (ref 30–115)
ALT FLD-CCNC: 98 U/L — HIGH (ref 0–41)
ANION GAP SERPL CALC-SCNC: 18 MMOL/L — HIGH (ref 7–14)
APPEARANCE UR: CLEAR — SIGNIFICANT CHANGE UP
AST SERPL-CCNC: 74 U/L — HIGH (ref 0–41)
B-OH-BUTYR SERPL-SCNC: 2.9 MMOL/L — HIGH
BACTERIA # UR AUTO: NEGATIVE /HPF — SIGNIFICANT CHANGE UP
BASE EXCESS BLDV CALC-SCNC: -2.6 MMOL/L — LOW (ref -2–3)
BASE EXCESS BLDV CALC-SCNC: -5.2 MMOL/L — LOW (ref -2–3)
BASOPHILS # BLD AUTO: 0.07 K/UL — SIGNIFICANT CHANGE UP (ref 0–0.2)
BASOPHILS NFR BLD AUTO: 1.4 % — HIGH (ref 0–1)
BILIRUB DIRECT SERPL-MCNC: <0.2 MG/DL — SIGNIFICANT CHANGE UP (ref 0–0.3)
BILIRUB INDIRECT FLD-MCNC: >0 MG/DL — LOW (ref 0.2–1.2)
BILIRUB SERPL-MCNC: 0.2 MG/DL — SIGNIFICANT CHANGE UP (ref 0.2–1.2)
BILIRUB UR-MCNC: NEGATIVE — SIGNIFICANT CHANGE UP
BUN SERPL-MCNC: 25 MG/DL — HIGH (ref 10–20)
CA-I SERPL-SCNC: 1.28 MMOL/L — SIGNIFICANT CHANGE UP (ref 1.15–1.33)
CA-I SERPL-SCNC: 1.29 MMOL/L — SIGNIFICANT CHANGE UP (ref 1.15–1.33)
CALCIUM SERPL-MCNC: 9.2 MG/DL — SIGNIFICANT CHANGE UP (ref 8.4–10.5)
CAST: 0 /LPF — SIGNIFICANT CHANGE UP (ref 0–4)
CHLORIDE SERPL-SCNC: 93 MMOL/L — LOW (ref 98–110)
CO2 SERPL-SCNC: 21 MMOL/L — SIGNIFICANT CHANGE UP (ref 17–32)
COLOR SPEC: YELLOW — SIGNIFICANT CHANGE UP
CREAT SERPL-MCNC: 1.1 MG/DL — SIGNIFICANT CHANGE UP (ref 0.7–1.5)
DIFF PNL FLD: NEGATIVE — SIGNIFICANT CHANGE UP
EGFR: 84 ML/MIN/1.73M2 — SIGNIFICANT CHANGE UP
EGFR: 84 ML/MIN/1.73M2 — SIGNIFICANT CHANGE UP
EOSINOPHIL # BLD AUTO: 0.06 K/UL — SIGNIFICANT CHANGE UP (ref 0–0.7)
EOSINOPHIL NFR BLD AUTO: 1.2 % — SIGNIFICANT CHANGE UP (ref 0–8)
GAS PNL BLDV: 127 MMOL/L — LOW (ref 136–145)
GAS PNL BLDV: 132 MMOL/L — LOW (ref 136–145)
GAS PNL BLDV: SIGNIFICANT CHANGE UP
GLUCOSE BLDC GLUCOMTR-MCNC: 291 MG/DL — HIGH (ref 70–99)
GLUCOSE BLDC GLUCOMTR-MCNC: 305 MG/DL — HIGH (ref 70–99)
GLUCOSE BLDC GLUCOMTR-MCNC: 324 MG/DL — HIGH (ref 70–99)
GLUCOSE BLDC GLUCOMTR-MCNC: 348 MG/DL — HIGH (ref 70–99)
GLUCOSE SERPL-MCNC: 610 MG/DL — CRITICAL HIGH (ref 70–99)
GLUCOSE UR QL: >=1000 MG/DL
HCO3 BLDV-SCNC: 23 MMOL/L — SIGNIFICANT CHANGE UP (ref 22–29)
HCO3 BLDV-SCNC: 24 MMOL/L — SIGNIFICANT CHANGE UP (ref 22–29)
HCT VFR BLD CALC: 31 % — LOW (ref 42–52)
HCT VFR BLDA CALC: 29 % — LOW (ref 39–51)
HCT VFR BLDA CALC: 53 % — HIGH (ref 39–51)
HGB BLD CALC-MCNC: 17.8 G/DL — HIGH (ref 12.6–17.4)
HGB BLD CALC-MCNC: 9.7 G/DL — LOW (ref 12.6–17.4)
HGB BLD-MCNC: 10.3 G/DL — LOW (ref 14–18)
IMM GRANULOCYTES NFR BLD AUTO: 0.2 % — SIGNIFICANT CHANGE UP (ref 0.1–0.3)
KETONES UR-MCNC: 15 MG/DL
LACTATE BLDV-MCNC: 1 MMOL/L — SIGNIFICANT CHANGE UP (ref 0.5–2)
LACTATE BLDV-MCNC: 2.4 MMOL/L — HIGH (ref 0.5–2)
LEUKOCYTE ESTERASE UR-ACNC: NEGATIVE — SIGNIFICANT CHANGE UP
LIDOCAIN IGE QN: 58 U/L — SIGNIFICANT CHANGE UP (ref 7–60)
LYMPHOCYTES # BLD AUTO: 1.62 K/UL — SIGNIFICANT CHANGE UP (ref 1.2–3.4)
LYMPHOCYTES # BLD AUTO: 33 % — SIGNIFICANT CHANGE UP (ref 20.5–51.1)
MAGNESIUM SERPL-MCNC: 2.2 MG/DL — SIGNIFICANT CHANGE UP (ref 1.8–2.4)
MAGNESIUM SERPL-MCNC: 2.3 MG/DL — SIGNIFICANT CHANGE UP (ref 1.8–2.4)
MCHC RBC-ENTMCNC: 30.9 PG — SIGNIFICANT CHANGE UP (ref 27–31)
MCHC RBC-ENTMCNC: 33.2 G/DL — SIGNIFICANT CHANGE UP (ref 32–37)
MCV RBC AUTO: 93.1 FL — SIGNIFICANT CHANGE UP (ref 80–94)
MONOCYTES # BLD AUTO: 0.47 K/UL — SIGNIFICANT CHANGE UP (ref 0.1–0.6)
MONOCYTES NFR BLD AUTO: 9.6 % — HIGH (ref 1.7–9.3)
NEUTROPHILS # BLD AUTO: 2.68 K/UL — SIGNIFICANT CHANGE UP (ref 1.4–6.5)
NEUTROPHILS NFR BLD AUTO: 54.6 % — SIGNIFICANT CHANGE UP (ref 42.2–75.2)
NITRITE UR-MCNC: NEGATIVE — SIGNIFICANT CHANGE UP
NRBC BLD AUTO-RTO: 0 /100 WBCS — SIGNIFICANT CHANGE UP (ref 0–0)
PCO2 BLDV: 47 MMHG — SIGNIFICANT CHANGE UP (ref 42–55)
PCO2 BLDV: 52 MMHG — SIGNIFICANT CHANGE UP (ref 42–55)
PH BLDV: 7.25 — LOW (ref 7.32–7.43)
PH BLDV: 7.31 — LOW (ref 7.32–7.43)
PH UR: 6 — SIGNIFICANT CHANGE UP (ref 5–8)
PHOSPHATE SERPL-MCNC: 3.2 MG/DL — SIGNIFICANT CHANGE UP (ref 2.1–4.9)
PLATELET # BLD AUTO: 200 K/UL — SIGNIFICANT CHANGE UP (ref 130–400)
PMV BLD: 11.5 FL — HIGH (ref 7.4–10.4)
PO2 BLDV: 27 MMHG — SIGNIFICANT CHANGE UP (ref 25–45)
PO2 BLDV: 41 MMHG — SIGNIFICANT CHANGE UP (ref 25–45)
POTASSIUM BLDV-SCNC: 4.4 MMOL/L — SIGNIFICANT CHANGE UP (ref 3.5–5.1)
POTASSIUM BLDV-SCNC: 4.4 MMOL/L — SIGNIFICANT CHANGE UP (ref 3.5–5.1)
POTASSIUM SERPL-MCNC: 4.4 MMOL/L — SIGNIFICANT CHANGE UP (ref 3.5–5)
POTASSIUM SERPL-SCNC: 4.4 MMOL/L — SIGNIFICANT CHANGE UP (ref 3.5–5)
PROT SERPL-MCNC: 6.8 G/DL — SIGNIFICANT CHANGE UP (ref 6–8)
PROT UR-MCNC: 30 MG/DL
RBC # BLD: 3.33 M/UL — LOW (ref 4.7–6.1)
RBC # FLD: 11.9 % — SIGNIFICANT CHANGE UP (ref 11.5–14.5)
RBC CASTS # UR COMP ASSIST: 0 /HPF — SIGNIFICANT CHANGE UP (ref 0–4)
SAO2 % BLDV: 41.1 % — LOW (ref 67–88)
SAO2 % BLDV: 71 % — SIGNIFICANT CHANGE UP (ref 67–88)
SODIUM SERPL-SCNC: 132 MMOL/L — LOW (ref 135–146)
SP GR SPEC: >1.03 — HIGH (ref 1–1.03)
SQUAMOUS # UR AUTO: 0 /HPF — SIGNIFICANT CHANGE UP (ref 0–5)
TROPONIN T, HIGH SENSITIVITY RESULT: 21 NG/L — SIGNIFICANT CHANGE UP (ref 6–21)
UROBILINOGEN FLD QL: 0.2 MG/DL — SIGNIFICANT CHANGE UP (ref 0.2–1)
WBC # BLD: 4.91 K/UL — SIGNIFICANT CHANGE UP (ref 4.8–10.8)
WBC # FLD AUTO: 4.91 K/UL — SIGNIFICANT CHANGE UP (ref 4.8–10.8)
WBC UR QL: 0 /HPF — SIGNIFICANT CHANGE UP (ref 0–5)

## 2025-02-27 PROCEDURE — 82803 BLOOD GASES ANY COMBINATION: CPT

## 2025-02-27 PROCEDURE — 84295 ASSAY OF SERUM SODIUM: CPT

## 2025-02-27 PROCEDURE — 85018 HEMOGLOBIN: CPT

## 2025-02-27 PROCEDURE — 80354 DRUG SCREENING FENTANYL: CPT

## 2025-02-27 PROCEDURE — 82330 ASSAY OF CALCIUM: CPT

## 2025-02-27 PROCEDURE — 80048 BASIC METABOLIC PNL TOTAL CA: CPT

## 2025-02-27 PROCEDURE — 84100 ASSAY OF PHOSPHORUS: CPT

## 2025-02-27 PROCEDURE — 93306 TTE W/DOPPLER COMPLETE: CPT

## 2025-02-27 PROCEDURE — 76705 ECHO EXAM OF ABDOMEN: CPT | Mod: 26

## 2025-02-27 PROCEDURE — 85025 COMPLETE CBC W/AUTO DIFF WBC: CPT

## 2025-02-27 PROCEDURE — 93005 ELECTROCARDIOGRAM TRACING: CPT

## 2025-02-27 PROCEDURE — 71045 X-RAY EXAM CHEST 1 VIEW: CPT | Mod: 26

## 2025-02-27 PROCEDURE — 84132 ASSAY OF SERUM POTASSIUM: CPT

## 2025-02-27 PROCEDURE — 82533 TOTAL CORTISOL: CPT

## 2025-02-27 PROCEDURE — 85014 HEMATOCRIT: CPT

## 2025-02-27 PROCEDURE — 83735 ASSAY OF MAGNESIUM: CPT

## 2025-02-27 PROCEDURE — 82962 GLUCOSE BLOOD TEST: CPT

## 2025-02-27 PROCEDURE — 99291 CRITICAL CARE FIRST HOUR: CPT

## 2025-02-27 PROCEDURE — 80053 COMPREHEN METABOLIC PANEL: CPT

## 2025-02-27 PROCEDURE — 84443 ASSAY THYROID STIM HORMONE: CPT

## 2025-02-27 PROCEDURE — 80307 DRUG TEST PRSMV CHEM ANLYZR: CPT

## 2025-02-27 PROCEDURE — 36415 COLL VENOUS BLD VENIPUNCTURE: CPT

## 2025-02-27 PROCEDURE — 84484 ASSAY OF TROPONIN QUANT: CPT

## 2025-02-27 PROCEDURE — 74177 CT ABD & PELVIS W/CONTRAST: CPT | Mod: 26

## 2025-02-27 PROCEDURE — 76705 ECHO EXAM OF ABDOMEN: CPT

## 2025-02-27 PROCEDURE — 83605 ASSAY OF LACTIC ACID: CPT

## 2025-02-27 RX ORDER — ENOXAPARIN SODIUM 100 MG/ML
40 INJECTION SUBCUTANEOUS EVERY 24 HOURS
Refills: 0 | Status: DISCONTINUED | OUTPATIENT
Start: 2025-02-27 | End: 2025-03-04

## 2025-02-27 RX ORDER — POTASSIUM CHLORIDE, DEXTROSE MONOHYDRATE AND SODIUM CHLORIDE 150; 5; 900 MG/100ML; G/100ML; MG/100ML
1000 INJECTION, SOLUTION INTRAVENOUS
Refills: 0 | Status: DISCONTINUED | OUTPATIENT
Start: 2025-02-27 | End: 2025-02-28

## 2025-02-27 RX ADMIN — Medication 3 UNIT(S)/HR: at 21:19

## 2025-02-27 RX ADMIN — Medication 6 UNIT(S)/HR: at 18:59

## 2025-02-27 RX ADMIN — Medication 1000 MILLILITER(S): at 16:48

## 2025-02-27 RX ADMIN — POTASSIUM CHLORIDE, DEXTROSE MONOHYDRATE AND SODIUM CHLORIDE 250 MILLILITER(S): 150; 5; 900 INJECTION, SOLUTION INTRAVENOUS at 22:03

## 2025-02-27 RX ADMIN — Medication 1000 MILLILITER(S): at 18:53

## 2025-02-27 RX ADMIN — ENOXAPARIN SODIUM 40 MILLIGRAM(S): 100 INJECTION SUBCUTANEOUS at 22:03

## 2025-02-27 NOTE — H&P ADULT - NSHPPHYSICALEXAM_GEN_ALL_CORE
CONSTITUTIONAL: Non-toxic, in NAD, thin-appearing   SKIN: Normal  HEAD: NCAT  EYES: No conjunctival injection  ENT: Moist mucous membranes, normal pharynx with no erythema or exudates  NECK: Supple; full ROM. Nontender. No cervical LAD  CARD: RRR, no murmurs, rubs or gallops  RESP: Clear to ausculation bilaterally.  No rales, rhonchi, or wheezing.  ABD: Soft, non-distended, non-tender, no rebound or guarding. No CVA tenderness  EXT: Full ROM,   NEURO: Normal motor, normal sensory.

## 2025-02-27 NOTE — H&P ADULT - NSHPLABSRESULTS_GEN_ALL_CORE
.  LABS:                         10.3   4.91  )-----------( 200      ( 27 Feb 2025 13:41 )             31.0     02-27    132[L]  |  93[L]  |  25[H]  ----------------------------<  610[HH]  4.4   |  21  |  1.1    Ca    9.2      27 Feb 2025 13:41  Phos  3.2     02-27  Mg     2.2     02-27    TPro  6.8  /  Alb  4.4  /  TBili  0.2  /  DBili  <0.2  /  AST  74[H]  /  ALT  98[H]  /  AlkPhos  220[H]  02-27      Urinalysis Basic - ( 27 Feb 2025 16:46 )    Color: Yellow / Appearance: Clear / SG: >1.030 / pH: x  Gluc: x / Ketone: 15 mg/dL  / Bili: Negative / Urobili: 0.2 mg/dL   Blood: x / Protein: 30 mg/dL / Nitrite: Negative   Leuk Esterase: Negative / RBC: 0 /HPF / WBC 0 /HPF   Sq Epi: x / Non Sq Epi: 0 /HPF / Bacteria: Negative /HPF        RADIOLOGY, EKG & ADDITIONAL TESTS: Reviewed.

## 2025-02-27 NOTE — ED PROVIDER NOTE - ATTENDING CONTRIBUTION TO CARE
I have personally performed a history and physical exam on this patient and personally directed the management of the patient. Attending Statement: I have personally provided the amount of critical care time documented below excluding time spent on separate procedures.     Critical Care Time Spent (min) Must be 30 or more minutes to qualify: 35.    See MDM.

## 2025-02-27 NOTE — ED ADULT NURSE NOTE - OBJECTIVE STATEMENT
pt presents to ED with c/o chest pain. pt states he was sent by his MD for chest pain. pt states his A1C was also elevated when he received his bloodwork back.

## 2025-02-27 NOTE — ED PROVIDER NOTE - OBJECTIVE STATEMENT
Patient is a 44 year old male with PMH of recently diagnosed DM presenting for medical evaluation. Patient was sent in by his PMD for reports of uncontrolled diabetes, associated with unintentional weight loss, and jaundice appearing. Patient denies chest pain, fevers, nausea, vomiting, urinary symptoms, or additional complaints.

## 2025-02-27 NOTE — H&P ADULT - HISTORY OF PRESENT ILLNESS
Patient is a 44 year old male with PMH of recently diagnosed DM presenting for medical evaluation. Patient was sent in by his PMD for reports of uncontrolled diabetes, associated with unintentional weight loss. Patient denies chest pain, fevers, nausea, vomiting, urinary symptoms, or additional complaints  In the ED patient HD stable, glucose 610, B-hydroxy 2.9, VBG: pH 7.25

## 2025-02-27 NOTE — H&P ADULT - ASSESSMENT
DKA  Lactic acidosis   Transaminitis   HO DM      PLAN:    CNS: Avoid depressants    HEENT: Oral care    PULMONARY:  HOB @ 45 degrees.  Aspiration precautions. O2 support if necessary,     CARDIOVASCULAR: s/p 2L Bolus , start IV fluids 0.45 NS + K additive @ 250cc/hr, change to D5/0.45NS who glucose < 250     GI: GI prophylaxis not indicated.  Feed when tolerating diet. f/u RUG sono     RENAL:  STAT BMP. Correct as needed. BMP q 2 hrs and monitor AG, IV fluids, trend lactate     INFECTIOUS DISEASE: Monitor off abx, F/u UA, RVP    HEMATOLOGICAL:  DVT prophylaxis    ENDOCRINE:  Follow up FS q1 , Insulin infusion per DKA protocol, A1C. TSH  Endocrinology consult    MUSCULOSKELETAL: bed rest   DKA  Lactic acidosis   Transaminitis   HO DM      PLAN:    CNS: Avoid depressants    HEENT: Oral care    PULMONARY:  HOB @ 45 degrees.  Aspiration precautions. O2 support if necessary,     CARDIOVASCULAR: s/p 2L Bolus , start IV fluids 0.45 NS + K additive @ 250cc/hr, change to D5/0.45NS when glucose < 250     GI: GI prophylaxis not indicated.  Feed when tolerating diet. f/u RUG sono     RENAL:  STAT BMP. Correct as needed. BMP q 2 hrs and monitor AG, IV fluids, trend lactate     INFECTIOUS DISEASE: Monitor off abx, F/u UA, RVP    HEMATOLOGICAL:  DVT prophylaxis    ENDOCRINE:  Follow up FS q1 , Insulin infusion per DKA protocol, A1C. TSH  Endocrinology consult    MUSCULOSKELETAL: bed rest

## 2025-02-27 NOTE — ED PROVIDER NOTE - PHYSICAL EXAMINATION
VITAL SIGNS: I have reviewed nursing notes and confirm.  CONSTITUTIONAL: Non-toxic, in NAD, thin-appearing   SKIN: slightly jaundice appearing   HEAD: NCAT  EYES: No conjunctival injection, scleral icterus   ENT: Moist mucous membranes, normal pharynx with no erythema or exudates  NECK: Supple; full ROM. Nontender. No cervical LAD  CARD: RRR, no murmurs, rubs or gallops  RESP: Clear to ausculation bilaterally.  No rales, rhonchi, or wheezing.  ABD: Soft, non-distended, non-tender, no rebound or guarding. No CVA tenderness  EXT: Full ROM,   NEURO: Normal motor, normal sensory.

## 2025-02-27 NOTE — ED PROVIDER NOTE - CLINICAL SUMMARY MEDICAL DECISION MAKING FREE TEXT BOX
Critical Care Time Spent (min) Must be 30 or more minutes to qualify: 35.    44 year old male with PMH of recently diagnosed DM presenting for medical evaluation. Patient was sent in by his PMD for reports of uncontrolled diabetes, associated with unintentional weight loss. Patient denies chest pain, fevers, nausea, vomiting, urinary symptoms, or additional complaints.  In the ED patient HD stable, glucose 610, B-hydroxy 2.9, VBG: pH 7.25.  Insulin drip started. DW Critical Care.  ADMIT ICU.

## 2025-02-28 LAB
ALBUMIN SERPL ELPH-MCNC: 4 G/DL — SIGNIFICANT CHANGE UP (ref 3.5–5.2)
ALP SERPL-CCNC: 106 U/L — SIGNIFICANT CHANGE UP (ref 30–115)
ALT FLD-CCNC: 70 U/L — HIGH (ref 0–41)
ANION GAP SERPL CALC-SCNC: 12 MMOL/L — SIGNIFICANT CHANGE UP (ref 7–14)
ANION GAP SERPL CALC-SCNC: 6 MMOL/L — LOW (ref 7–14)
AST SERPL-CCNC: 44 U/L — HIGH (ref 0–41)
BASOPHILS # BLD AUTO: 0.04 K/UL — SIGNIFICANT CHANGE UP (ref 0–0.2)
BASOPHILS NFR BLD AUTO: 0.8 % — SIGNIFICANT CHANGE UP (ref 0–1)
BILIRUB SERPL-MCNC: <0.2 MG/DL — SIGNIFICANT CHANGE UP (ref 0.2–1.2)
BUN SERPL-MCNC: 22 MG/DL — HIGH (ref 10–20)
BUN SERPL-MCNC: 24 MG/DL — HIGH (ref 10–20)
CALCIUM SERPL-MCNC: 8.4 MG/DL — SIGNIFICANT CHANGE UP (ref 8.4–10.5)
CALCIUM SERPL-MCNC: 8.9 MG/DL — SIGNIFICANT CHANGE UP (ref 8.4–10.5)
CHLORIDE SERPL-SCNC: 102 MMOL/L — SIGNIFICANT CHANGE UP (ref 98–110)
CHLORIDE SERPL-SCNC: 104 MMOL/L — SIGNIFICANT CHANGE UP (ref 98–110)
CO2 SERPL-SCNC: 25 MMOL/L — SIGNIFICANT CHANGE UP (ref 17–32)
CO2 SERPL-SCNC: 29 MMOL/L — SIGNIFICANT CHANGE UP (ref 17–32)
CREAT SERPL-MCNC: 1.1 MG/DL — SIGNIFICANT CHANGE UP (ref 0.7–1.5)
CREAT SERPL-MCNC: 1.2 MG/DL — SIGNIFICANT CHANGE UP (ref 0.7–1.5)
EGFR: 76 ML/MIN/1.73M2 — SIGNIFICANT CHANGE UP
EGFR: 76 ML/MIN/1.73M2 — SIGNIFICANT CHANGE UP
EGFR: 84 ML/MIN/1.73M2 — SIGNIFICANT CHANGE UP
EGFR: 84 ML/MIN/1.73M2 — SIGNIFICANT CHANGE UP
EOSINOPHIL # BLD AUTO: 0.06 K/UL — SIGNIFICANT CHANGE UP (ref 0–0.7)
EOSINOPHIL NFR BLD AUTO: 1.2 % — SIGNIFICANT CHANGE UP (ref 0–8)
GLUCOSE BLDC GLUCOMTR-MCNC: 101 MG/DL — HIGH (ref 70–99)
GLUCOSE BLDC GLUCOMTR-MCNC: 112 MG/DL — HIGH (ref 70–99)
GLUCOSE BLDC GLUCOMTR-MCNC: 141 MG/DL — HIGH (ref 70–99)
GLUCOSE BLDC GLUCOMTR-MCNC: 147 MG/DL — HIGH (ref 70–99)
GLUCOSE BLDC GLUCOMTR-MCNC: 214 MG/DL — HIGH (ref 70–99)
GLUCOSE BLDC GLUCOMTR-MCNC: 233 MG/DL — HIGH (ref 70–99)
GLUCOSE BLDC GLUCOMTR-MCNC: 242 MG/DL — HIGH (ref 70–99)
GLUCOSE BLDC GLUCOMTR-MCNC: 383 MG/DL — HIGH (ref 70–99)
GLUCOSE SERPL-MCNC: 118 MG/DL — HIGH (ref 70–99)
GLUCOSE SERPL-MCNC: 288 MG/DL — HIGH (ref 70–99)
HCT VFR BLD CALC: 28.7 % — LOW (ref 42–52)
HGB BLD-MCNC: 9.3 G/DL — LOW (ref 14–18)
IMM GRANULOCYTES NFR BLD AUTO: 0.4 % — HIGH (ref 0.1–0.3)
LYMPHOCYTES # BLD AUTO: 1.68 K/UL — SIGNIFICANT CHANGE UP (ref 1.2–3.4)
LYMPHOCYTES # BLD AUTO: 34.1 % — SIGNIFICANT CHANGE UP (ref 20.5–51.1)
MAGNESIUM SERPL-MCNC: 2.1 MG/DL — SIGNIFICANT CHANGE UP (ref 1.8–2.4)
MCHC RBC-ENTMCNC: 30.3 PG — SIGNIFICANT CHANGE UP (ref 27–31)
MCHC RBC-ENTMCNC: 32.4 G/DL — SIGNIFICANT CHANGE UP (ref 32–37)
MCV RBC AUTO: 93.5 FL — SIGNIFICANT CHANGE UP (ref 80–94)
MONOCYTES # BLD AUTO: 0.51 K/UL — SIGNIFICANT CHANGE UP (ref 0.1–0.6)
MONOCYTES NFR BLD AUTO: 10.4 % — HIGH (ref 1.7–9.3)
NEUTROPHILS # BLD AUTO: 2.61 K/UL — SIGNIFICANT CHANGE UP (ref 1.4–6.5)
NEUTROPHILS NFR BLD AUTO: 53.1 % — SIGNIFICANT CHANGE UP (ref 42.2–75.2)
NRBC BLD AUTO-RTO: 0 /100 WBCS — SIGNIFICANT CHANGE UP (ref 0–0)
PLATELET # BLD AUTO: 192 K/UL — SIGNIFICANT CHANGE UP (ref 130–400)
PMV BLD: 11.3 FL — HIGH (ref 7.4–10.4)
POTASSIUM SERPL-MCNC: 4.1 MMOL/L — SIGNIFICANT CHANGE UP (ref 3.5–5)
POTASSIUM SERPL-MCNC: 4.1 MMOL/L — SIGNIFICANT CHANGE UP (ref 3.5–5)
POTASSIUM SERPL-SCNC: 4.1 MMOL/L — SIGNIFICANT CHANGE UP (ref 3.5–5)
POTASSIUM SERPL-SCNC: 4.1 MMOL/L — SIGNIFICANT CHANGE UP (ref 3.5–5)
PROT SERPL-MCNC: 5.6 G/DL — LOW (ref 6–8)
RBC # BLD: 3.07 M/UL — LOW (ref 4.7–6.1)
RBC # FLD: 12.3 % — SIGNIFICANT CHANGE UP (ref 11.5–14.5)
SODIUM SERPL-SCNC: 137 MMOL/L — SIGNIFICANT CHANGE UP (ref 135–146)
SODIUM SERPL-SCNC: 141 MMOL/L — SIGNIFICANT CHANGE UP (ref 135–146)
TSH SERPL-MCNC: 2.2 UIU/ML — SIGNIFICANT CHANGE UP (ref 0.27–4.2)
WBC # BLD: 4.92 K/UL — SIGNIFICANT CHANGE UP (ref 4.8–10.8)
WBC # FLD AUTO: 4.92 K/UL — SIGNIFICANT CHANGE UP (ref 4.8–10.8)

## 2025-02-28 PROCEDURE — 99223 1ST HOSP IP/OBS HIGH 75: CPT

## 2025-02-28 RX ORDER — INSULIN GLARGINE-YFGN 100 [IU]/ML
20 INJECTION, SOLUTION SUBCUTANEOUS EVERY MORNING
Refills: 0 | Status: DISCONTINUED | OUTPATIENT
Start: 2025-02-28 | End: 2025-03-04

## 2025-02-28 RX ORDER — SODIUM CHLORIDE 9 G/1000ML
1000 INJECTION, SOLUTION INTRAVENOUS
Refills: 0 | Status: DISCONTINUED | OUTPATIENT
Start: 2025-02-28 | End: 2025-03-03

## 2025-02-28 RX ORDER — INSULIN LISPRO 100 U/ML
INJECTION, SOLUTION INTRAVENOUS; SUBCUTANEOUS
Refills: 0 | Status: DISCONTINUED | OUTPATIENT
Start: 2025-02-28 | End: 2025-03-04

## 2025-02-28 RX ORDER — INSULIN LISPRO 100 U/ML
7 INJECTION, SOLUTION INTRAVENOUS; SUBCUTANEOUS
Refills: 0 | Status: DISCONTINUED | OUTPATIENT
Start: 2025-02-28 | End: 2025-03-04

## 2025-02-28 RX ADMIN — INSULIN GLARGINE-YFGN 20 UNIT(S): 100 INJECTION, SOLUTION SUBCUTANEOUS at 08:41

## 2025-02-28 RX ADMIN — ENOXAPARIN SODIUM 40 MILLIGRAM(S): 100 INJECTION SUBCUTANEOUS at 22:11

## 2025-02-28 RX ADMIN — INSULIN GLARGINE-YFGN 20 UNIT(S): 100 INJECTION, SOLUTION SUBCUTANEOUS at 01:40

## 2025-02-28 RX ADMIN — INSULIN LISPRO 4: 100 INJECTION, SOLUTION INTRAVENOUS; SUBCUTANEOUS at 12:15

## 2025-02-28 RX ADMIN — SODIUM CHLORIDE 75 MILLILITER(S): 9 INJECTION, SOLUTION INTRAVENOUS at 18:16

## 2025-02-28 RX ADMIN — INSULIN LISPRO 7 UNIT(S): 100 INJECTION, SOLUTION INTRAVENOUS; SUBCUTANEOUS at 17:35

## 2025-02-28 RX ADMIN — Medication 6 UNIT(S)/HR: at 00:21

## 2025-02-28 RX ADMIN — INSULIN LISPRO 7 UNIT(S): 100 INJECTION, SOLUTION INTRAVENOUS; SUBCUTANEOUS at 08:40

## 2025-02-28 RX ADMIN — INSULIN LISPRO 7 UNIT(S): 100 INJECTION, SOLUTION INTRAVENOUS; SUBCUTANEOUS at 12:37

## 2025-02-28 NOTE — CONSULT NOTE ADULT - ASSESSMENT
44 year old male with PMH of recently diagnosed DM presenting for medical evaluation. Patient was sent in by his PMD for reports of uncontrolled diabetes, associated with unintentional weight loss.  Pt admitted with DKA. Endo called for recs on management.     #DKA - resolved   #DM   - presented with f/s/o dka in labs, no symptoms   - A1c - pending   - Takes 25U lantus at night and metformin 1g Bid at home   - currently on 20U lantus am and lispro 7tid with meals ---> well controlled sugars     INCOMPELTE  44 year old male with PMH of recently diagnosed DM presenting for medical evaluation. Patient was sent in by his PMD for reports of uncontrolled diabetes, associated with unintentional weight loss.  Pt admitted with DKA. Endo called for recs on management.     #DKA - resolved   #DM   - presented with f/s/o dka in labs, no symptoms   - A1c - pending   - Takes 25U lantus at night and metformin 1g Bid at home   - currently on 20U lantus am and lispro 7tid with meals ---> well controlled sugars   - continue the same dose for now

## 2025-02-28 NOTE — ED ADULT NURSE REASSESSMENT NOTE - NS ED NURSE REASSESS COMMENT FT1
Report received from SCOTTY Hernandez. Pt assessed, A&Ox4, Denies any pain/discomfort at this time. IVL maintained. Cardiac monitoring continued. Safety and comfort maintained. All care rendered as ordered. Will continue w/ goals of care.

## 2025-02-28 NOTE — CONSULT NOTE ADULT - SUBJECTIVE AND OBJECTIVE BOX
Patient is a 44y old  Male who presents with a chief complaint of DKA (27 Feb 2025 19:27)      HPI:  Patient is a 44 year old male with PMH of recently diagnosed DM presenting for medical evaluation. Patient was sent in by his PMD for reports of uncontrolled diabetes, associated with unintentional weight loss. Patient denies chest pain, fevers, nausea, vomiting, urinary symptoms, or additional complaints  In the ED patient HD stable, glucose 610, B-hydroxy 2.9, VBG: pH 7.25 (27 Feb 2025 19:27)      PAST MEDICAL & SURGICAL HISTORY:  Diabetes      No significant past surgical history          SOCIAL HX:   Smoking                         ETOH                            Other    FAMILY HISTORY:  FH: diabetes mellitus  extensive famaily history of diabetes    :  No known cardiovacular family hisotry     Review Of Systems:     All ROS are negative except per HPI       Allergies    No Known Allergies    Intolerances          PHYSICAL EXAM    ICU Vital Signs Last 24 Hrs  T(C): 36.9 (28 Feb 2025 00:09), Max: 36.9 (28 Feb 2025 00:09)  T(F): 98.5 (28 Feb 2025 00:09), Max: 98.5 (28 Feb 2025 00:09)  HR: 73 (28 Feb 2025 00:09) (70 - 83)  BP: 89/52 (28 Feb 2025 00:09) (89/52 - 118/65)  BP(mean): --  ABP: --  ABP(mean): --  RR: 18 (28 Feb 2025 00:09) (16 - 18)  SpO2: 98% (28 Feb 2025 00:09) (98% - 98%)    O2 Parameters below as of 28 Feb 2025 00:09  Patient On (Oxygen Delivery Method): room air            CONSTITUTIONAL:  Well nourished.   NAD    ENT:   Airway patent,   Mouth with normal mucosa.   No thrush      CARDIAC:   Normal rate,   Regular rhythm.    No edema      Vascular:   normal systolic impulse  no bruits    RESPIRATORY:   No wheezing  Bilateral BS   Not tachypneic,  No use of accessory muscles    GASTROINTESTINAL:  Abdomen soft,   Non-tender,   No guarding,   + BS      NEUROLOGICAL:   Alert and oriented   No motor deficits.    SKIN:   Skin normal color for race,   No evidence of rash.      HEME LYMPH: .  No cervical  lymphadenopathy.  No inguinal lymphadenopathy              LABS:                          10.3   4.91  )-----------( 200      ( 27 Feb 2025 13:41 )             31.0                                               02-27    141  |  104  |  22[H]  ----------------------------<  288[H]  4.1   |  25  |  1.2    Ca    8.4      27 Feb 2025 22:05  Phos  3.2     02-27  Mg     2.2     02-27    TPro  6.8  /  Alb  4.4  /  TBili  0.2  /  DBili  <0.2  /  AST  74[H]  /  ALT  98[H]  /  AlkPhos  220[H]  02-27                                             Urinalysis Basic - ( 27 Feb 2025 22:05 )    Color: x / Appearance: x / SG: x / pH: x  Gluc: 288 mg/dL / Ketone: x  / Bili: x / Urobili: x   Blood: x / Protein: x / Nitrite: x   Leuk Esterase: x / RBC: x / WBC x   Sq Epi: x / Non Sq Epi: x / Bacteria: x                                                  LIVER FUNCTIONS - ( 27 Feb 2025 13:41 )  Alb: 4.4 g/dL / Pro: 6.8 g/dL / ALK PHOS: 220 U/L / ALT: 98 U/L / AST: 74 U/L / GGT: x                                                                                                                                       X-Rays reviewed                                                                                     ECHO    CXR interpreted by me No infiltrate     MEDICATIONS  (STANDING):  enoxaparin Injectable 40 milliGRAM(s) SubCutaneous every 24 hours  insulin glargine Injectable (LANTUS) 20 Unit(s) SubCutaneous every morning  insulin lispro (ADMELOG) corrective regimen sliding scale   SubCutaneous three times a day before meals  insulin lispro Injectable (ADMELOG) 7 Unit(s) SubCutaneous three times a day before meals  sodium chloride 0.45% with potassium chloride 20 mEq/L 1000 milliLiter(s) (250 mL/Hr) IV Continuous <Continuous>    MEDICATIONS  (PRN):         Patient is a 44y old  Male who presents with a chief complaint of DKA (27 Feb 2025 19:27)      HPI:  Patient is a 44 year old male with PMH of recently diagnosed DM presenting for medical evaluation. Patient was sent in by his PMD for reports of uncontrolled diabetes, associated with unintentional weight loss. Patient denies chest pain, fevers, nausea, vomiting, urinary symptoms, or additional complaints  In the ED patient HD stable, glucose 610, B-hydroxy 2.9, VBG: pH 7.25 (27 Feb 2025 19:27)      PAST MEDICAL & SURGICAL HISTORY:  Diabetes      No significant past surgical history          SOCIAL HX:   Smoking       NO                   ETOH                            Other    FAMILY HISTORY:  FH: diabetes mellitus  extensive famaily history of diabetes    :  No known cardiovacular family hisotry     Review Of Systems:     All ROS are negative except per HPI       Allergies    No Known Allergies    Intolerances          PHYSICAL EXAM    ICU Vital Signs Last 24 Hrs  T(C): 36.9 (28 Feb 2025 00:09), Max: 36.9 (28 Feb 2025 00:09)  T(F): 98.5 (28 Feb 2025 00:09), Max: 98.5 (28 Feb 2025 00:09)  HR: 73 (28 Feb 2025 00:09) (70 - 83)  BP: 89/52 (28 Feb 2025 00:09) (89/52 - 118/65)  BP(mean): --  ABP: --  ABP(mean): --  RR: 18 (28 Feb 2025 00:09) (16 - 18)  SpO2: 98% (28 Feb 2025 00:09) (98% - 98%)    O2 Parameters below as of 28 Feb 2025 00:09  Patient On (Oxygen Delivery Method): room air            CONSTITUTIONAL:  NAD    ENT:   Airway patent,   Mouth with normal mucosa.     CARDIAC:   Normal rate,   Regular rhythm.      RESPIRATORY:   No wheezing  Bilateral BS   Not tachypneic,  No use of accessory muscles    GASTROINTESTINAL:  Abdomen soft,   Non-tender,   No guarding,   + BS      NEUROLOGICAL:   Alert and oriented   No motor deficits.    SKIN:   Skin normal color for race,   No evidence of rash.      LABS:                          10.3   4.91  )-----------( 200      ( 27 Feb 2025 13:41 )             31.0                                               02-27    141  |  104  |  22[H]  ----------------------------<  288[H]  4.1   |  25  |  1.2    Ca    8.4      27 Feb 2025 22:05  Phos  3.2     02-27  Mg     2.2     02-27    TPro  6.8  /  Alb  4.4  /  TBili  0.2  /  DBili  <0.2  /  AST  74[H]  /  ALT  98[H]  /  AlkPhos  220[H]  02-27                                             Urinalysis Basic - ( 27 Feb 2025 22:05 )    Color: x / Appearance: x / SG: x / pH: x  Gluc: 288 mg/dL / Ketone: x  / Bili: x / Urobili: x   Blood: x / Protein: x / Nitrite: x   Leuk Esterase: x / RBC: x / WBC x   Sq Epi: x / Non Sq Epi: x / Bacteria: x                                                  LIVER FUNCTIONS - ( 27 Feb 2025 13:41 )  Alb: 4.4 g/dL / Pro: 6.8 g/dL / ALK PHOS: 220 U/L / ALT: 98 U/L / AST: 74 U/L / GGT: x                                                                                                                                       X-Rays reviewed                                                                                     ECHO    CXR interpreted by me No infiltrate     MEDICATIONS  (STANDING):  enoxaparin Injectable 40 milliGRAM(s) SubCutaneous every 24 hours  insulin glargine Injectable (LANTUS) 20 Unit(s) SubCutaneous every morning  insulin lispro (ADMELOG) corrective regimen sliding scale   SubCutaneous three times a day before meals  insulin lispro Injectable (ADMELOG) 7 Unit(s) SubCutaneous three times a day before meals  sodium chloride 0.45% with potassium chloride 20 mEq/L 1000 milliLiter(s) (250 mL/Hr) IV Continuous <Continuous>    MEDICATIONS  (PRN):

## 2025-02-28 NOTE — PATIENT PROFILE ADULT - FALL HARM RISK - HARM RISK INTERVENTIONS

## 2025-02-28 NOTE — PATIENT PROFILE ADULT - TRANSPORTATION
[FreeTextEntry1] : 17 yo young woman here with multiple medical complaints today.\par \par 1) Bug bites:  advised to apply cool compresses to relieve burning.  May use hydrocortisone cream if pruritic but not currently pruritic.  Return to clinic if not improving.  She currently has a URI but no further fever since yesterday and symptoms are improving.  If not improving consider mosquito borne illness given she was abroad in the [place].\par \par 2) Viral URI:  She has congestion, sore throat, ear pain. Had fever yesterday per patient but no further fever and is well-appearing on exam today.  Recommend supportive care- plenty of fluids, rest.  She can also use symptomatic treatment if needed e.g. throat lozenges, analgesics, nasal saline.\par \par 3)  Dysuria with vaginal discharge:  She is sexually active with multiple partners and does not use condoms consistently.  Will do STI testing today to include GC/chlamydia, HIV, syphilis, and trichomonas.  Will also send BD Affirm given discharge, although did not have much discharge on exam today.  Encourage condom use consistently for STI prevention.  Rebecca has Nexplanon in place for pregnancy prevention.\par \par 4)  Hypothyroidism:  She does not consistently take her levothyroxine and has not taken it much since restarting it in Oct so would not be useful to re-test thyroid hormones today.  Discussed with Rebecca multiple techniques to help her remember to take her medication: set phone alarm, put medication by her toothbrush since she remembers to brush her teeth daily, place a sign by her door so she remembers when she is leaving the house, use a pill box.  She is willing to try putting it by her toothbrush.  Discussed importance of taking her medication every day.  Reminded her she needs to make follow up appt with endocrine this month.\par \par 5) Tinea Corporis- Econazole 1% cream dispensed.  Apply to affected areas once daily for 2 weeks.\par \par 6) Insurance help needed:  Rebecca wants to go on her own Medicaid now that she is 16.  Resource provided ([facility]) for her to receive help enrolling in insurance.\par \par 7) F/U in 2 days for results. no

## 2025-02-28 NOTE — CONSULT NOTE ADULT - SUBJECTIVE AND OBJECTIVE BOX
ENDOCRINE INITIAL CONSULT -     HPI:  Patient is a 44 year old male with PMH of recently diagnosed DM presenting for medical evaluation. Patient was sent in by his PMD for reports of uncontrolled diabetes, associated with unintentional weight loss. Patient denies chest pain, fevers, nausea, vomiting, urinary symptoms, or additional complaints  In the ED patient HD stable, glucose 610, B-hydroxy 2.9, VBG: pH 7.25 (27 Feb 2025 19:27)      ENDOCRINE HISTORY - Pt admitted with DKA. Endo called for recs on management.     PAST MEDICAL & SURGICAL HISTORY:  Diabetes  No significant past surgical history    FAMILY HISTORY:  FH: diabetes mellitus  extensive famaily history of diabetes    Home Medications:  Lantus 100 units/mL subcutaneous solution: 25 unit(s) subcutaneous once a day (at bedtime) (27 Feb 2025 19:32)  metFORMIN 1000 mg oral tablet: 1 tab(s) orally 2 times a day (27 Feb 2025 19:32)      MEDICATIONS  (STANDING):  enoxaparin Injectable 40 milliGRAM(s) SubCutaneous every 24 hours  insulin glargine Injectable (LANTUS) 20 Unit(s) SubCutaneous every morning  insulin lispro (ADMELOG) corrective regimen sliding scale   SubCutaneous three times a day before meals  insulin lispro Injectable (ADMELOG) 7 Unit(s) SubCutaneous three times a day before meals  sodium chloride 0.45% with potassium chloride 20 mEq/L 1000 milliLiter(s) (250 mL/Hr) IV Continuous <Continuous>      Allergies  No Known Allergies  Intolerances      PHYSICAL EXAM   Vital Signs Last 24 Hrs  T(C): 36.3 (28 Feb 2025 07:19), Max: 36.9 (28 Feb 2025 00:09)  T(F): 97.4 (28 Feb 2025 07:19), Max: 98.5 (28 Feb 2025 00:09)  HR: 72 (28 Feb 2025 07:19) (70 - 83)  BP: 145/93 (28 Feb 2025 07:19) (89/52 - 145/93)  BP(mean): --  RR: 18 (28 Feb 2025 07:19) (16 - 18)  SpO2: 99% (28 Feb 2025 07:19) (98% - 99%)    Parameters below as of 28 Feb 2025 07:19  Patient On (Oxygen Delivery Method): room air      GENERAL: NAD, well-groomed, well-developed  EYES: No proptosis, no lid lag, anicteric  HEENT:  Atraumatic, Normocephalic, moist mucous membranes  THYROID: Normal size, no palpable nodules  RESPIRATORY: Clear to auscultation bilaterally; No rales, rhonchi, wheezing  CARDIOVASCULAR: Regular rate and rhythm; No murmurs  GI: Soft, nontender, non distended, normal bowel sounds  SKIN: Dry, intact  MUSCULOSKELETAL: Moving extremities spontaneously, no peripheral edema  NEURO: sensation intact, extraocular movements intact, no tremor  PSYCH: Answering questions appropriately, normal affect, normal mood  CUSHING'S SIGNS: no striae, no acanthosis, no dorsocervical fatpad    CAPILLARY BLOOD GLUCOSE      POCT Blood Glucose.: 147 mg/dL (28 Feb 2025 09:33)  POCT Blood Glucose.: 141 mg/dL (28 Feb 2025 08:28)  POCT Blood Glucose.: 101 mg/dL (28 Feb 2025 03:37)  POCT Blood Glucose.: 233 mg/dL (28 Feb 2025 01:30)  POCT Blood Glucose.: 383 mg/dL (28 Feb 2025 00:20)  POCT Blood Glucose.: 324 mg/dL (27 Feb 2025 22:57)  POCT Blood Glucose.: 291 mg/dL (27 Feb 2025 22:08)  POCT Blood Glucose.: 305 mg/dL (27 Feb 2025 20:25)  POCT Blood Glucose.: 348 mg/dL (27 Feb 2025 19:03)                         10.3   4.91  )-----------( 200      ( 27 Feb 2025 13:41 )             31.0       02-27    141  |  104  |  22[H]  ----------------------------<  288[H]  4.1   |  25  |  1.2    Ca    8.4      27 Feb 2025 22:05  Phos  3.2     02-27  Mg     2.2     02-27    TPro  6.8  /  Alb  4.4  /  TBili  0.2  /  DBili  <0.2  /  AST  74[H]  /  ALT  98[H]  /  AlkPhos  220[H]  02-27

## 2025-02-28 NOTE — CHART NOTE - NSCHARTNOTEFT_GEN_A_CORE
HPI:   Patient is a 44 year old male with PMH of recently diagnosed DM presenting for medical evaluation. Patient was sent in by his PMD for reports of uncontrolled diabetes, associated with unintentional weight loss. Patient denies chest pain, fevers, nausea, vomiting, urinary symptoms, or additional complaints  In the ED patient HD stable, glucose 610, B-hydroxy 2.9, VBG: pH 7.25    MICU course:   Patient was started on Insulin drip, gap closed, he was bridged to SQ Insulin, switched IV to LR    24H events:    Patient is a 44y old Male who presents with a chief complaint of DKA (28 Feb 2025 11:19)    Primary diagnosis of DKA (diabetic ketoacidosis)    Today is hospital day 1d. This morning patient was seen and examined at bedside, resting comfortably in bed.    No acute or major events overnight.    PAST MEDICAL & SURGICAL HISTORY  Diabetes    No significant past surgical history      SOCIAL HISTORY:  Social History:      ALLERGIES:  No Known Allergies    MEDICATIONS:  STANDING MEDICATIONS  enoxaparin Injectable 40 milliGRAM(s) SubCutaneous every 24 hours  insulin glargine Injectable (LANTUS) 20 Unit(s) SubCutaneous every morning  insulin lispro (ADMELOG) corrective regimen sliding scale   SubCutaneous three times a day before meals  insulin lispro Injectable (ADMELOG) 7 Unit(s) SubCutaneous three times a day before meals  lactated ringers. 1000 milliLiter(s) IV Continuous <Continuous>    PRN MEDICATIONS    VITALS:   T(F): 97.4  HR: 72  BP: 145/93  RR: 18  SpO2: 99%    PHYSICAL EXAM:  GENERAL:   ( x ) NAD, lying in bed comfortably     (  ) obtunded     (  ) lethargic     (  ) somnolent    HEAD:   ( x ) Atraumatic     (  ) hematoma     (  ) laceration (specify location:       )     NECK:  ( X ) Supple     (  ) neck stiffness     (  ) nuchal rigidity     (  )  no JVD     (  ) JVD present ( -- cm)    HEART:  Rate -->     ( X ) normal rate     (  ) bradycardic     (  ) tachycardic  Rhythm -->     ( X ) regular     (  ) regularly irregular     (  ) irregularly irregular  Murmurs -->     ( X ) normal s1s2     (  ) systolic murmur     (  ) diastolic murmur     (  ) continuous murmur      (  ) S3 present     (  ) S4 present    LUNGS:   ( X )Unlabored respirations     (  ) tachypnea  ( X ) B/L air entry     (  ) decreased breath sounds in:  (location     )    ( X ) no adventitious sound     (  ) crackles     (  ) wheezing      (  ) rhonchi      (specify location:       )  (  ) chest wall tenderness (specify location:       )    ABDOMEN:   ( X ) Soft     (  ) tense   |   (  ) nondistended     (  ) distended   |   (  ) +BS     (  ) hypoactive bowel sounds     (  ) hyperactive bowel sounds  (  ) nontender     (  ) RUQ tenderness     (  ) RLQ tenderness     (  ) LLQ tenderness     (  ) epigastric tenderness     (  ) diffuse tenderness  (  ) Splenomegaly      (  ) Hepatomegaly      (  ) Jaundice     (  ) ecchymosis     EXTREMITIES:  ( X ) Normal     (  ) Rash     (  ) ecchymosis     (  ) varicose veins      (  ) pitting edema     (  ) non-pitting edema   (  ) ulceration     (  ) gangrene:     (location:     )    NERVOUS SYSTEM:    ( X ) A&Ox3     (  ) confused     (  ) lethargic  CN II-XII:     (  ) Intact     (  ) deficits found     (Specify:     )   Upper extremities:     (  ) no sensorimotor deficits     (  ) weakness     (  ) loss of proprioception/vibration     (  ) loss of touch/temperature (specify:    )  Lower extremities:     (  ) no sensorimotor deficits     (  ) weakness     (  ) loss of proprioception/vibration     (  ) loss of touch/temperature (specify:    )    SKIN:   ( X ) No rashes or lesions     (  ) maculopapular rash     (  ) pustules     (  ) vesicles     (  ) ulcer     (  ) ecchymosis     (specify location:     )    LABS:                        10.3   4.91  )-----------( 200      ( 27 Feb 2025 13:41 )             31.0     02-27    141  |  104  |  22[H]  ----------------------------<  288[H]  4.1   |  25  |  1.2    Ca    8.4      27 Feb 2025 22:05  Phos  3.2     02-27  Mg     2.2     02-27    TPro  6.8  /  Alb  4.4  /  TBili  0.2  /  DBili  <0.2  /  AST  74[H]  /  ALT  98[H]  /  AlkPhos  220[H]  02-27      Urinalysis Basic - ( 27 Feb 2025 22:05 )    Color: x / Appearance: x / SG: x / pH: x  Gluc: 288 mg/dL / Ketone: x  / Bili: x / Urobili: x   Blood: x / Protein: x / Nitrite: x   Leuk Esterase: x / RBC: x / WBC x   Sq Epi: x / Non Sq Epi: x / Bacteria: x      Assessment and plan:  Patient is a 44 year old male with PMH of recently diagnosed DM admitted to the ICU for DKA    #DKA resolved  #DM  - s/p Inusilin drip  - Gap closed, switched to SQ Insulin  - Monitor FS   - Endo consulted    #Transaminitis  - LFTs elevated on admission  - CT AP and RUQ US unremarkable  - Trend LFTs  - Avoid hepatotoxic meds    #Misc:  - DVT ppx: Lovenox  - GI ppx: Not needed  - Diet: Carb consistent  - Activity: AAT

## 2025-02-28 NOTE — CONSULT NOTE ADULT - ASSESSMENT
IMPRESSION:      PLAN:    CNS:    HEENT: Oral care    PULMONARY:  HOB @ 45 degrees.  Aspiration precautions     CARDIOVASCULAR:    GI: GI prophylaxis.  Feeding.  Bowel regimen     RENAL:  Follow up lytes.  Correct as needed    INFECTIOUS DISEASE: Follow up cultures    HEMATOLOGICAL:  DVT prophylaxis.    ENDOCRINE:  Follow up FS.  Insulin protocol if needed.    MUSCULOSKELETAL:         IMPRESSION:    DKA resolved   Recent DX of DM     PLAN:    CNS:  No depressants.      HEENT: Oral care    PULMONARY:  HOB @ 45 degrees.  Aspiration precautions.  On RA.  CXR ntoed.      CARDIOVASCULAR:  Avoid overload.  ECHO    GI: GI prophylaxis.  Feeding.  Bowel regimen.  RUQ sono noted      RENAL:  Follow up lytes.  Correct as needed.      INFECTIOUS DISEASE: Follow up cultures.  Monitor VS.      HEMATOLOGICAL:  DVT prophylaxis.  Dimer     ENDOCRINE:  Follow up FS.  SQ Insulin.  Endo follow up     MUSCULOSKELETAL:  OOB     DGTF     This is a transition of care note.    For any questions or clarifications during regular hours, please do not hesitate to call or text me.    For after hours and weekends, please call the MICU fellow at [phone].

## 2025-02-28 NOTE — CONSULT NOTE ADULT - ATTENDING COMMENTS
Type 2 DM-insulin-requiring; DKA (resolved).  Continue current basa/.bolus insulin.  Encouraged adherence with outpatient regimen.

## 2025-02-28 NOTE — CHART NOTE - NSCHARTNOTEFT_GEN_A_CORE
--Notified of downgrade , Please call Me on teams and notify  Medicine  if any wisdom in medical condition

## 2025-03-01 ENCOUNTER — RESULT REVIEW (OUTPATIENT)
Age: 45
End: 2025-03-01

## 2025-03-01 LAB
GLUCOSE BLDC GLUCOMTR-MCNC: 116 MG/DL — HIGH (ref 70–99)
GLUCOSE BLDC GLUCOMTR-MCNC: 134 MG/DL — HIGH (ref 70–99)
GLUCOSE BLDC GLUCOMTR-MCNC: 222 MG/DL — HIGH (ref 70–99)
GLUCOSE BLDC GLUCOMTR-MCNC: 230 MG/DL — HIGH (ref 70–99)

## 2025-03-01 PROCEDURE — 93306 TTE W/DOPPLER COMPLETE: CPT | Mod: 26

## 2025-03-01 PROCEDURE — 99232 SBSQ HOSP IP/OBS MODERATE 35: CPT

## 2025-03-01 RX ADMIN — INSULIN LISPRO 7 UNIT(S): 100 INJECTION, SOLUTION INTRAVENOUS; SUBCUTANEOUS at 08:31

## 2025-03-01 RX ADMIN — INSULIN GLARGINE-YFGN 20 UNIT(S): 100 INJECTION, SOLUTION SUBCUTANEOUS at 08:31

## 2025-03-01 RX ADMIN — ENOXAPARIN SODIUM 40 MILLIGRAM(S): 100 INJECTION SUBCUTANEOUS at 21:42

## 2025-03-01 RX ADMIN — INSULIN LISPRO 4: 100 INJECTION, SOLUTION INTRAVENOUS; SUBCUTANEOUS at 08:30

## 2025-03-01 RX ADMIN — INSULIN LISPRO 7 UNIT(S): 100 INJECTION, SOLUTION INTRAVENOUS; SUBCUTANEOUS at 12:38

## 2025-03-01 RX ADMIN — INSULIN LISPRO 7 UNIT(S): 100 INJECTION, SOLUTION INTRAVENOUS; SUBCUTANEOUS at 17:44

## 2025-03-02 LAB
GLUCOSE BLDC GLUCOMTR-MCNC: 137 MG/DL — HIGH (ref 70–99)
GLUCOSE BLDC GLUCOMTR-MCNC: 238 MG/DL — HIGH (ref 70–99)
GLUCOSE BLDC GLUCOMTR-MCNC: 238 MG/DL — HIGH (ref 70–99)

## 2025-03-02 PROCEDURE — 99232 SBSQ HOSP IP/OBS MODERATE 35: CPT

## 2025-03-02 RX ADMIN — SODIUM CHLORIDE 75 MILLILITER(S): 9 INJECTION, SOLUTION INTRAVENOUS at 18:41

## 2025-03-02 RX ADMIN — INSULIN LISPRO 4: 100 INJECTION, SOLUTION INTRAVENOUS; SUBCUTANEOUS at 17:23

## 2025-03-02 RX ADMIN — INSULIN GLARGINE-YFGN 20 UNIT(S): 100 INJECTION, SOLUTION SUBCUTANEOUS at 08:24

## 2025-03-02 RX ADMIN — ENOXAPARIN SODIUM 40 MILLIGRAM(S): 100 INJECTION SUBCUTANEOUS at 21:32

## 2025-03-02 RX ADMIN — INSULIN LISPRO 7 UNIT(S): 100 INJECTION, SOLUTION INTRAVENOUS; SUBCUTANEOUS at 11:46

## 2025-03-02 RX ADMIN — INSULIN LISPRO 7 UNIT(S): 100 INJECTION, SOLUTION INTRAVENOUS; SUBCUTANEOUS at 08:17

## 2025-03-02 RX ADMIN — INSULIN LISPRO 4: 100 INJECTION, SOLUTION INTRAVENOUS; SUBCUTANEOUS at 08:16

## 2025-03-02 RX ADMIN — INSULIN LISPRO 7 UNIT(S): 100 INJECTION, SOLUTION INTRAVENOUS; SUBCUTANEOUS at 17:23

## 2025-03-03 ENCOUNTER — TRANSCRIPTION ENCOUNTER (OUTPATIENT)
Age: 45
End: 2025-03-03

## 2025-03-03 LAB
ALBUMIN SERPL ELPH-MCNC: 3.3 G/DL — LOW (ref 3.5–5.2)
ALP SERPL-CCNC: 110 U/L — SIGNIFICANT CHANGE UP (ref 30–115)
ALT FLD-CCNC: 72 U/L — HIGH (ref 0–41)
ANION GAP SERPL CALC-SCNC: 8 MMOL/L — SIGNIFICANT CHANGE UP (ref 7–14)
AST SERPL-CCNC: 69 U/L — HIGH (ref 0–41)
BASOPHILS # BLD AUTO: 0.05 K/UL — SIGNIFICANT CHANGE UP (ref 0–0.2)
BASOPHILS NFR BLD AUTO: 1.1 % — HIGH (ref 0–1)
BILIRUB SERPL-MCNC: <0.2 MG/DL — SIGNIFICANT CHANGE UP (ref 0.2–1.2)
BUN SERPL-MCNC: 19 MG/DL — SIGNIFICANT CHANGE UP (ref 10–20)
CALCIUM SERPL-MCNC: 8.8 MG/DL — SIGNIFICANT CHANGE UP (ref 8.4–10.5)
CHLORIDE SERPL-SCNC: 105 MMOL/L — SIGNIFICANT CHANGE UP (ref 98–110)
CO2 SERPL-SCNC: 28 MMOL/L — SIGNIFICANT CHANGE UP (ref 17–32)
CREAT SERPL-MCNC: 1.1 MG/DL — SIGNIFICANT CHANGE UP (ref 0.7–1.5)
EGFR: 84 ML/MIN/1.73M2 — SIGNIFICANT CHANGE UP
EGFR: 84 ML/MIN/1.73M2 — SIGNIFICANT CHANGE UP
EOSINOPHIL # BLD AUTO: 0.12 K/UL — SIGNIFICANT CHANGE UP (ref 0–0.7)
EOSINOPHIL NFR BLD AUTO: 2.6 % — SIGNIFICANT CHANGE UP (ref 0–8)
GLUCOSE BLDC GLUCOMTR-MCNC: 101 MG/DL — HIGH (ref 70–99)
GLUCOSE BLDC GLUCOMTR-MCNC: 122 MG/DL — HIGH (ref 70–99)
GLUCOSE BLDC GLUCOMTR-MCNC: 152 MG/DL — HIGH (ref 70–99)
GLUCOSE BLDC GLUCOMTR-MCNC: 240 MG/DL — HIGH (ref 70–99)
GLUCOSE BLDC GLUCOMTR-MCNC: 241 MG/DL — HIGH (ref 70–99)
GLUCOSE BLDC GLUCOMTR-MCNC: 272 MG/DL — HIGH (ref 70–99)
GLUCOSE BLDC GLUCOMTR-MCNC: 77 MG/DL — SIGNIFICANT CHANGE UP (ref 70–99)
GLUCOSE BLDC GLUCOMTR-MCNC: 90 MG/DL — SIGNIFICANT CHANGE UP (ref 70–99)
GLUCOSE SERPL-MCNC: 246 MG/DL — HIGH (ref 70–99)
HCT VFR BLD CALC: 29.9 % — LOW (ref 42–52)
HGB BLD-MCNC: 9.5 G/DL — LOW (ref 14–18)
IMM GRANULOCYTES NFR BLD AUTO: 0.2 % — SIGNIFICANT CHANGE UP (ref 0.1–0.3)
LYMPHOCYTES # BLD AUTO: 2.12 K/UL — SIGNIFICANT CHANGE UP (ref 1.2–3.4)
LYMPHOCYTES # BLD AUTO: 45.9 % — SIGNIFICANT CHANGE UP (ref 20.5–51.1)
MAGNESIUM SERPL-MCNC: 1.8 MG/DL — SIGNIFICANT CHANGE UP (ref 1.8–2.4)
MCHC RBC-ENTMCNC: 30.4 PG — SIGNIFICANT CHANGE UP (ref 27–31)
MCHC RBC-ENTMCNC: 31.8 G/DL — LOW (ref 32–37)
MCV RBC AUTO: 95.5 FL — HIGH (ref 80–94)
MONOCYTES # BLD AUTO: 0.52 K/UL — SIGNIFICANT CHANGE UP (ref 0.1–0.6)
MONOCYTES NFR BLD AUTO: 11.3 % — HIGH (ref 1.7–9.3)
NEUTROPHILS # BLD AUTO: 1.8 K/UL — SIGNIFICANT CHANGE UP (ref 1.4–6.5)
NEUTROPHILS NFR BLD AUTO: 38.9 % — LOW (ref 42.2–75.2)
NRBC BLD AUTO-RTO: 0 /100 WBCS — SIGNIFICANT CHANGE UP (ref 0–0)
PHOSPHATE SERPL-MCNC: 4.1 MG/DL — SIGNIFICANT CHANGE UP (ref 2.1–4.9)
PLATELET # BLD AUTO: 189 K/UL — SIGNIFICANT CHANGE UP (ref 130–400)
PMV BLD: 11.3 FL — HIGH (ref 7.4–10.4)
POTASSIUM SERPL-MCNC: 4.6 MMOL/L — SIGNIFICANT CHANGE UP (ref 3.5–5)
POTASSIUM SERPL-SCNC: 4.6 MMOL/L — SIGNIFICANT CHANGE UP (ref 3.5–5)
PROT SERPL-MCNC: 5.2 G/DL — LOW (ref 6–8)
RBC # BLD: 3.13 M/UL — LOW (ref 4.7–6.1)
RBC # FLD: 12.3 % — SIGNIFICANT CHANGE UP (ref 11.5–14.5)
SODIUM SERPL-SCNC: 141 MMOL/L — SIGNIFICANT CHANGE UP (ref 135–146)
TROPONIN T, HIGH SENSITIVITY RESULT: 16 NG/L — SIGNIFICANT CHANGE UP (ref 6–21)
TROPONIN T, HIGH SENSITIVITY RESULT: 18 NG/L — SIGNIFICANT CHANGE UP (ref 6–21)
WBC # BLD: 4.62 K/UL — LOW (ref 4.8–10.8)
WBC # FLD AUTO: 4.62 K/UL — LOW (ref 4.8–10.8)

## 2025-03-03 PROCEDURE — 93010 ELECTROCARDIOGRAM REPORT: CPT

## 2025-03-03 PROCEDURE — 99232 SBSQ HOSP IP/OBS MODERATE 35: CPT

## 2025-03-03 RX ORDER — INSULIN LISPRO 100 U/ML
7 INJECTION, SOLUTION INTRAVENOUS; SUBCUTANEOUS
Qty: 4 | Refills: 0
Start: 2025-03-03

## 2025-03-03 RX ORDER — METFORMIN HYDROCHLORIDE 850 MG/1
1 TABLET ORAL
Qty: 60 | Refills: 0
Start: 2025-03-03 | End: 2025-04-01

## 2025-03-03 RX ORDER — SODIUM CHLORIDE 9 G/1000ML
2000 INJECTION, SOLUTION INTRAVENOUS ONCE
Refills: 0 | Status: COMPLETED | OUTPATIENT
Start: 2025-03-03 | End: 2025-03-03

## 2025-03-03 RX ORDER — GABAPENTIN 400 MG/1
1 CAPSULE ORAL
Qty: 30 | Refills: 0
Start: 2025-03-03

## 2025-03-03 RX ORDER — GABAPENTIN 400 MG/1
100 CAPSULE ORAL DAILY
Refills: 0 | Status: DISCONTINUED | OUTPATIENT
Start: 2025-03-03 | End: 2025-03-04

## 2025-03-03 RX ORDER — INSULIN GLARGINE-YFGN 100 [IU]/ML
20 INJECTION, SOLUTION SUBCUTANEOUS
Qty: 1 | Refills: 0
Start: 2025-03-03

## 2025-03-03 RX ORDER — SODIUM CHLORIDE 9 G/1000ML
1000 INJECTION, SOLUTION INTRAVENOUS
Refills: 0 | Status: DISCONTINUED | OUTPATIENT
Start: 2025-03-03 | End: 2025-03-04

## 2025-03-03 RX ADMIN — SODIUM CHLORIDE 75 MILLILITER(S): 9 INJECTION, SOLUTION INTRAVENOUS at 16:14

## 2025-03-03 RX ADMIN — SODIUM CHLORIDE 2 MILLILITER(S): 9 INJECTION, SOLUTION INTRAVENOUS at 13:56

## 2025-03-03 RX ADMIN — SODIUM CHLORIDE 75 MILLILITER(S): 9 INJECTION, SOLUTION INTRAVENOUS at 14:05

## 2025-03-03 RX ADMIN — INSULIN LISPRO 6: 100 INJECTION, SOLUTION INTRAVENOUS; SUBCUTANEOUS at 13:13

## 2025-03-03 RX ADMIN — INSULIN LISPRO 7 UNIT(S): 100 INJECTION, SOLUTION INTRAVENOUS; SUBCUTANEOUS at 08:03

## 2025-03-03 RX ADMIN — ENOXAPARIN SODIUM 40 MILLIGRAM(S): 100 INJECTION SUBCUTANEOUS at 21:21

## 2025-03-03 RX ADMIN — INSULIN LISPRO 4: 100 INJECTION, SOLUTION INTRAVENOUS; SUBCUTANEOUS at 08:03

## 2025-03-03 RX ADMIN — INSULIN LISPRO 7 UNIT(S): 100 INJECTION, SOLUTION INTRAVENOUS; SUBCUTANEOUS at 14:23

## 2025-03-03 RX ADMIN — Medication 1000 MILLILITER(S): at 17:44

## 2025-03-03 RX ADMIN — SODIUM CHLORIDE 75 MILLILITER(S): 9 INJECTION, SOLUTION INTRAVENOUS at 10:26

## 2025-03-03 RX ADMIN — INSULIN LISPRO 7 UNIT(S): 100 INJECTION, SOLUTION INTRAVENOUS; SUBCUTANEOUS at 17:01

## 2025-03-03 RX ADMIN — INSULIN GLARGINE-YFGN 20 UNIT(S): 100 INJECTION, SOLUTION SUBCUTANEOUS at 08:03

## 2025-03-03 RX ADMIN — GABAPENTIN 100 MILLIGRAM(S): 400 CAPSULE ORAL at 11:26

## 2025-03-03 NOTE — DISCHARGE NOTE PROVIDER - HOSPITAL COURSE
ED Presentation    Floor Course    Floor evaluation and management as per last inpatient assessment and plan. Patient is to follow-up outpatient with their Primary Care Provider (post-discharge visit),    Patient is a 44 year old male with PMH of recently diagnosed DM admitted to the ICU for DKA; downgraded to floors after DKA resolved.    #DKA resolved  #DM  - s/p insulin drip  - Gap closed, switched to SQ Insulin  - Monitor FS   - Endo consulted  - cw 20U lantus am and lispro 7tid with meals ---> well controlled sugars     #Transaminitis  - LFTs elevated on admission  - CT AP and RUQ US unremarkable  - Trend LFTs  - Avoid hepatotoxic meds ED Presentation    Patient is a 44 year old male with PMH of recently diagnosed DM presenting for medical evaluation. Patient was sent in by his PMD for reports of uncontrolled diabetes, associated with unintentional weight loss. Patient denies chest pain, fevers, nausea, vomiting, urinary symptoms, or additional complaints  In the ED patient HD stable, glucose 610, B-hydroxy 2.9, VBG: pH 7.25    Admitted for DKA    Floor Course    Floor evaluation and management as per last inpatient assessment and plan. Patient is to follow-up outpatient with their Primary Care Provider (post-discharge visit) and endocrinology.    Patient is a 44 year old male with PMH of recently diagnosed DM admitted to the ICU for DKA; downgraded to floors after DKA resolved.    #DKA resolved  #DM  - s/p insulin drip  - Gap closed, switched to SQ Insulin  - Monitor FS   - Endo consulted  - cw 20U lantus am and lispro 7tid with meals ---> well controlled sugars; continue per endocrine    #Transaminitis  - LFTs elevated on admission  - CT AP and RUQ US unremarkable  - Trend LFTs  - Avoid hepatotoxic meds ED Presentation    Patient is a 44 year old male with PMH of recently diagnosed DM presenting for medical evaluation. Patient was sent in by his PMD for reports of uncontrolled diabetes, associated with unintentional weight loss. Patient denies chest pain, fevers, nausea, vomiting, urinary symptoms, or additional complaints  In the ED patient HD stable, glucose 610, B-hydroxy 2.9, VBG: pH 7.25    Admitted for DKA    Floor Course    Floor evaluation and management as per last inpatient assessment and plan. Patient is to follow-up outpatient with their Primary Care Provider (post-discharge visit) and endocrinology.    Patient is a 44 year old male with PMH of recently diagnosed DM admitted to the ICU for DKA; downgraded to floors after DKA resolved.    #DKA resolved  #DM  - s/p insulin drip  - Gap closed, switched to SQ Insulin  - Monitor FS   - Endo consulted  - cw 20U lantus am and lispro 7tid with meals ---> well controlled sugars; continue per endocrine    #Transaminitis  - LFTs elevated on admission  - CT AP and RUQ US unremarkable  - Trend LFTs  - Avoid hepatotoxic meds    MD team notified that patient was seeing things and shaking. On arrival, patient complaining of pain is his shoulders and back of his neck. BP noted to be 55/36 initially, improved to 113/74 with IVF and Trendelenburg position. Ordered another fluid bolus, EKG, and troponin to follow-up.   BP stable  EKG stable  Trop negative  f/u ongoing work up for neuroendocrine with PMD

## 2025-03-03 NOTE — DISCHARGE NOTE PROVIDER - NSDCMRMEDTOKEN_GEN_ALL_CORE_FT
gabapentin 100 mg oral capsule: 1 cap(s) orally once a day  HumaLOG 100 units/mL injectable solution: 7 unit(s) injectable 3 times a day (before meals)  Lantus 100 units/mL subcutaneous solution: 20 unit(s) subcutaneous once a day (at bedtime)  metFORMIN 1000 mg oral tablet: 1 tab(s) orally 2 times a day   gabapentin 100 mg oral capsule: 1 cap(s) orally once a day  HumaLOG 100 units/mL injectable solution: 7 unit(s) injectable 3 times a day (before meals)  Lantus 100 units/mL subcutaneous solution: 20 unit(s) subcutaneous once a day (at bedtime)   gabapentin 100 mg oral capsule: 1 cap(s) orally once a day  HumaLOG 100 units/mL injectable solution: 7 unit(s) injectable 3 times a day (before meals)  Insulin Pen Needles, 4mm: 1 application subcutaneously 4 times a day. ** Use with insulin pen **  Lantus 100 units/mL subcutaneous solution: 20 unit(s) subcutaneous once a day (at bedtime)   alcohol swabs: Apply topically to affected area 4 times a day  Freestyle Precision Bo Strips: Test blood sugar four times a day when you see check blood glucose symbol or when symptoms don&#x27;t match [place] reading.  gabapentin 100 mg oral capsule: 1 cap(s) orally once a day  glucometer (per patient&#x27;s insurance): Test blood sugars four times a day. Dispense #1 glucometer.  HumaLOG 100 units/mL injectable solution: 7 unit(s) injectable 3 times a day (before meals)  Insulin Pen Needles, 4mm: 1 application subcutaneously 4 times a day. ** Use with insulin pen **  Insulin Pen Needles, 4mm: 1 application subcutaneously 4 times a day. ** Use with insulin pen **  lancets: 1 application subcutaneously 4 times a day  Lantus 100 units/mL subcutaneous solution: 20 unit(s) subcutaneous once a day (at bedtime)

## 2025-03-03 NOTE — DISCHARGE NOTE PROVIDER - NSDCCPCAREPLAN_GEN_ALL_CORE_FT
PRINCIPAL DISCHARGE DIAGNOSIS  Diagnosis: DKA (diabetic ketoacidosis)  Assessment and Plan of Treatment: You were admitted for elevated blood sugars. During this time your vitals were stabilized and you were started on medications to help manage your condition. Lab and imaging studies were also used to monitor your condition. Please follow-up with your outpatient primary care provider and medical specialty appointments. If you have any worsening symptoms please call 911 or return to the emergency department.

## 2025-03-03 NOTE — DISCHARGE NOTE PROVIDER - CARE PROVIDERS DIRECT ADDRESSES
[email or web address]

## 2025-03-03 NOTE — DISCHARGE NOTE PROVIDER - PROVIDER TOKENS
PROVIDER:[TOKEN:[[id number]],FOLLOWUP:[1 week]],PROVIDER:[TOKEN:[[id number]],FOLLOWUP:[1 week]]

## 2025-03-03 NOTE — DISCHARGE NOTE PROVIDER - NSDCFUADDAPPT_GEN_ALL_CORE_FT
Do you need a primary care doctor or follow-up with a specialist? Our care coordinators will help you find providers near you and schedule any follow-up care visits.    Monday-Friday: 9am-5pm    Call our [facility] team: [phone]

## 2025-03-03 NOTE — CHART NOTE - NSCHARTNOTEFT_GEN_A_CORE
MD team notified that patient was seeing things and shaking. On arrival, patient complaining of pain is his shoulders and back of his neck. BP noted to be 55/36 initially, improved to 113/74 with IVF and Trendelenburg position. Ordered another fluid bolus, EKG, and troponin to follow-up. MD team notified that patient was seeing things and shaking. On arrival, patient complaining of pain is his shoulders and back of his neck. BP noted to be 55/36 initially, improved to 113/74 with IVF and Trendelenburg position. Ordered another fluid bolus, EKG, and troponin to follow-up.     BP stable  EKG stable  Trop negative MD team notified that patient was seeing things and shaking. On arrival, patient complaining of pain is his shoulders and back of his neck. BP noted to be 55/36 initially, improved to 113/74 with IVF and Trendelenburg position. Ordered another fluid bolus, EKG, and troponin to follow-up.     BP stable  EKG stable  Trop negative    -----  Medicine Attending Addendum  Patient was seen and examined with medicine team.  Nursing records reviewed. I agree with the resident/PA/NP's note including past medical history, home medications, social history, allergies, surgical history, family history, and review of system. I have reviewed relevant vitals, laboratory values, imaging studies, and microbiology.   - Above resident's note was edited by me  - Will observe overnight and dc in the morning, as her acute low blood pressure is likely due to dehydration in setting of recently diagnosed with DKA  - rest of A/P as per detailed housestaff note above except above modifications    Total time spent to complete patient's bedside assessment, reviewed medical chart, discussed medical plan of care with team was 45 minutes with >50% of time spent face to face with patient, discussion with patient/family and/or coordination of care.

## 2025-03-03 NOTE — DISCHARGE NOTE PROVIDER - NSDCFUADDINST_GEN_ALL_CORE_FT
Patient is to follow-up outpatient with their Primary Care Provider (post-discharge visit) and endocrinology.

## 2025-03-03 NOTE — DISCHARGE NOTE PROVIDER - CARE PROVIDER_API CALL
Fredy White  Internal Medicine  [address] [place] [address]  Phone: [phone]  Fax: [phone]  Follow Up Time: 1 week    Rajinder Bellamy  Endocrinology/Metab/Diabetes  [address] [place] [address]  Phone: [phone]  Fax: [phone]  Follow Up Time: 1 week

## 2025-03-04 ENCOUNTER — TRANSCRIPTION ENCOUNTER (OUTPATIENT)
Age: 45
End: 2025-03-04

## 2025-03-04 VITALS — SYSTOLIC BLOOD PRESSURE: 115 MMHG | DIASTOLIC BLOOD PRESSURE: 75 MMHG

## 2025-03-04 LAB
ALBUMIN SERPL ELPH-MCNC: 3.8 G/DL — SIGNIFICANT CHANGE UP (ref 3.5–5.2)
ALP SERPL-CCNC: 105 U/L — SIGNIFICANT CHANGE UP (ref 30–115)
ALT FLD-CCNC: 89 U/L — HIGH (ref 0–41)
AMPHET UR-MCNC: NEGATIVE — SIGNIFICANT CHANGE UP
ANION GAP SERPL CALC-SCNC: 10 MMOL/L — SIGNIFICANT CHANGE UP (ref 7–14)
AST SERPL-CCNC: 99 U/L — HIGH (ref 0–41)
BARBITURATES UR SCN-MCNC: NEGATIVE — SIGNIFICANT CHANGE UP
BASOPHILS # BLD AUTO: 0.07 K/UL — SIGNIFICANT CHANGE UP (ref 0–0.2)
BASOPHILS NFR BLD AUTO: 1.7 % — HIGH (ref 0–1)
BENZODIAZ UR-MCNC: NEGATIVE — SIGNIFICANT CHANGE UP
BILIRUB SERPL-MCNC: <0.2 MG/DL — SIGNIFICANT CHANGE UP (ref 0.2–1.2)
BUN SERPL-MCNC: 21 MG/DL — HIGH (ref 10–20)
CALCIUM SERPL-MCNC: 8.6 MG/DL — SIGNIFICANT CHANGE UP (ref 8.4–10.5)
CHLORIDE SERPL-SCNC: 103 MMOL/L — SIGNIFICANT CHANGE UP (ref 98–110)
CO2 SERPL-SCNC: 25 MMOL/L — SIGNIFICANT CHANGE UP (ref 17–32)
COCAINE METAB.OTHER UR-MCNC: NEGATIVE — SIGNIFICANT CHANGE UP
CREAT SERPL-MCNC: 1.1 MG/DL — SIGNIFICANT CHANGE UP (ref 0.7–1.5)
DRUG SCREEN 1, URINE RESULT: SIGNIFICANT CHANGE UP
EGFR: 84 ML/MIN/1.73M2 — SIGNIFICANT CHANGE UP
EGFR: 84 ML/MIN/1.73M2 — SIGNIFICANT CHANGE UP
EOSINOPHIL # BLD AUTO: 0.09 K/UL — SIGNIFICANT CHANGE UP (ref 0–0.7)
EOSINOPHIL NFR BLD AUTO: 2.2 % — SIGNIFICANT CHANGE UP (ref 0–8)
FENTANYL UR QL: NEGATIVE — SIGNIFICANT CHANGE UP
GLUCOSE BLDC GLUCOMTR-MCNC: 146 MG/DL — HIGH (ref 70–99)
GLUCOSE BLDC GLUCOMTR-MCNC: 235 MG/DL — HIGH (ref 70–99)
GLUCOSE BLDC GLUCOMTR-MCNC: 240 MG/DL — HIGH (ref 70–99)
GLUCOSE SERPL-MCNC: 258 MG/DL — HIGH (ref 70–99)
HCT VFR BLD CALC: 28.8 % — LOW (ref 42–52)
HGB BLD-MCNC: 9.1 G/DL — LOW (ref 14–18)
IMM GRANULOCYTES NFR BLD AUTO: 0.2 % — SIGNIFICANT CHANGE UP (ref 0.1–0.3)
LYMPHOCYTES # BLD AUTO: 1.6 K/UL — SIGNIFICANT CHANGE UP (ref 1.2–3.4)
LYMPHOCYTES # BLD AUTO: 38.6 % — SIGNIFICANT CHANGE UP (ref 20.5–51.1)
MCHC RBC-ENTMCNC: 30.5 PG — SIGNIFICANT CHANGE UP (ref 27–31)
MCHC RBC-ENTMCNC: 31.6 G/DL — LOW (ref 32–37)
MCV RBC AUTO: 96.6 FL — HIGH (ref 80–94)
METHADONE UR-MCNC: NEGATIVE — SIGNIFICANT CHANGE UP
MONOCYTES # BLD AUTO: 0.37 K/UL — SIGNIFICANT CHANGE UP (ref 0.1–0.6)
MONOCYTES NFR BLD AUTO: 8.9 % — SIGNIFICANT CHANGE UP (ref 1.7–9.3)
NEUTROPHILS # BLD AUTO: 2 K/UL — SIGNIFICANT CHANGE UP (ref 1.4–6.5)
NEUTROPHILS NFR BLD AUTO: 48.4 % — SIGNIFICANT CHANGE UP (ref 42.2–75.2)
NRBC BLD AUTO-RTO: 0 /100 WBCS — SIGNIFICANT CHANGE UP (ref 0–0)
OPIATES UR-MCNC: NEGATIVE — SIGNIFICANT CHANGE UP
OXYCODONE UR-MCNC: NEGATIVE — SIGNIFICANT CHANGE UP
PCP UR-MCNC: NEGATIVE — SIGNIFICANT CHANGE UP
PLATELET # BLD AUTO: 205 K/UL — SIGNIFICANT CHANGE UP (ref 130–400)
PMV BLD: 11.5 FL — HIGH (ref 7.4–10.4)
POTASSIUM SERPL-MCNC: 4.2 MMOL/L — SIGNIFICANT CHANGE UP (ref 3.5–5)
POTASSIUM SERPL-SCNC: 4.2 MMOL/L — SIGNIFICANT CHANGE UP (ref 3.5–5)
PROPOXYPHENE QUALITATIVE URINE RESULT: NEGATIVE — SIGNIFICANT CHANGE UP
PROT SERPL-MCNC: 5.4 G/DL — LOW (ref 6–8)
RBC # BLD: 2.98 M/UL — LOW (ref 4.7–6.1)
RBC # FLD: 12.2 % — SIGNIFICANT CHANGE UP (ref 11.5–14.5)
SODIUM SERPL-SCNC: 138 MMOL/L — SIGNIFICANT CHANGE UP (ref 135–146)
THC UR QL: NEGATIVE — SIGNIFICANT CHANGE UP
WBC # BLD: 4.14 K/UL — LOW (ref 4.8–10.8)
WBC # FLD AUTO: 4.14 K/UL — LOW (ref 4.8–10.8)

## 2025-03-04 PROCEDURE — 99239 HOSP IP/OBS DSCHRG MGMT >30: CPT

## 2025-03-04 RX ORDER — ISOPROPYL ALCOHOL, BENZOCAINE .7; .06 ML/ML; ML/ML
0 SWAB TOPICAL
Qty: 100 | Refills: 1
Start: 2025-03-04

## 2025-03-04 RX ADMIN — INSULIN LISPRO 7 UNIT(S): 100 INJECTION, SOLUTION INTRAVENOUS; SUBCUTANEOUS at 08:21

## 2025-03-04 RX ADMIN — INSULIN LISPRO 4: 100 INJECTION, SOLUTION INTRAVENOUS; SUBCUTANEOUS at 12:09

## 2025-03-04 RX ADMIN — INSULIN GLARGINE-YFGN 20 UNIT(S): 100 INJECTION, SOLUTION SUBCUTANEOUS at 08:22

## 2025-03-04 RX ADMIN — INSULIN LISPRO 7 UNIT(S): 100 INJECTION, SOLUTION INTRAVENOUS; SUBCUTANEOUS at 12:09

## 2025-03-04 RX ADMIN — GABAPENTIN 100 MILLIGRAM(S): 400 CAPSULE ORAL at 11:36

## 2025-03-04 RX ADMIN — INSULIN LISPRO 4: 100 INJECTION, SOLUTION INTRAVENOUS; SUBCUTANEOUS at 08:21

## 2025-03-04 RX ADMIN — SODIUM CHLORIDE 75 MILLILITER(S): 9 INJECTION, SOLUTION INTRAVENOUS at 08:24

## 2025-03-04 NOTE — PROGRESS NOTE ADULT - ATTENDING COMMENTS
Medicine Attending Addendum  Patient was seen and examined with medicine team.  Nursing records reviewed. I agree with the resident/PA/NP's note including past medical history, home medications, social history, allergies, surgical history, family history, and review of system. I have reviewed relevant vitals, laboratory values, imaging studies, and microbiology.   - Above resident's note was edited by me  - rest of A/P as per detailed housestaff note above except above modifications    Total time spent to complete patient's bedside assessment, reviewed medical chart, discussed medical plan of care with team was 45 minutes with >50% of time spent face to face with patient, discussion with patient/family and/or coordination of care.
Medicine Attending Addendum  Patient was seen and examined with medicine team.  Nursing records reviewed. I agree with the resident/PA/NP's note including past medical history, home medications, social history, allergies, surgical history, family history, and review of system. I have reviewed relevant vitals, laboratory values, imaging studies, and microbiology.   - Above resident's note was edited by me  - dispo planning  - rest of A/P as per detailed housestaff note above except above modifications    Total time spent to complete patient's bedside assessment, reviewed medical chart, discussed medical plan of care with team was 45 minutes with >50% of time spent face to face with patient, discussion with patient/family and/or coordination of care.
Medicine Attending Addendum  Patient was seen and examined with medicine team.  Nursing records reviewed. I agree with the resident/PA/NP's note including past medical history, home medications, social history, allergies, surgical history, family history, and review of system. I have reviewed relevant vitals, laboratory values, imaging studies, and microbiology.   - Above resident's note was edited by me  - stable to PA home with close follow up with endocrine and PMD  - rest of A/P as per detailed housestaff note above except above modifications    Total time spent to complete patient's bedside assessment, reviewed medical chart, discussed medical plan of care with team was 45 minutes with >50% of time spent face to face with patient, discussion with patient/family and/or coordination of care.

## 2025-03-04 NOTE — DISCHARGE NOTE NURSING/CASE MANAGEMENT/SOCIAL WORK - NSDCFUADDAPPT_GEN_ALL_CORE_FT
Do you need a primary care doctor or follow-up with a specialist? Our care coordinators will help you find providers near you and schedule any follow-up care visits.    Monday-Friday: 9am-5pm    Call our [facility] team: [phone]

## 2025-03-04 NOTE — PROGRESS NOTE ADULT - ASSESSMENT
Patient is a 44 year old male with PMH of recently diagnosed DM admitted to the ICU for DKA; downgraded to floors after DKA resolved    #DKA resolved  #DM  - s/p insulin drip  - Gap closed, switched to SQ Insulin  - Monitor FS   - Endo consulted  - cw 20U lantus am and lispro 7tid with meals ---> well controlled sugars     #Transaminitis  - LFTs elevated on admission  - CT AP and RUQ US unremarkable  - Trend LFTs  - Avoid hepatotoxic meds    #Misc:  - DVT ppx: Lovenox  - GI ppx: Not needed  - Diet: Carb consistent  - Activity: AAT.
Patient is a 44 year old male with PMH of recently diagnosed DM admitted to the ICU for DKA; downgraded to floors after DKA resolved    #Episode of hypotension  - r/o neuroendocrine cause  - am cortisol + 5-HIAA  - f/u with endocrine    #DKA resolved  #DM  - s/p insulin drip  - Gap closed, switched to SQ Insulin  - Monitor FS   - Endo consulted  - cw 20U lantus am and lispro 7tid with meals ---> well controlled sugars     #Transaminitis  - LFTs elevated on admission  - CT AP and RUQ US unremarkable  - Trend LFTs  - Avoid hepatotoxic meds    #Misc:  - DVT ppx: Lovenox  - GI ppx: Not needed  - Diet: Carb consistent  - Activity: AAT.

## 2025-03-04 NOTE — DISCHARGE NOTE NURSING/CASE MANAGEMENT/SOCIAL WORK - FINANCIAL ASSISTANCE
[facility] provides services at a reduced cost to those who are determined to be eligible through [facility]’s financial assistance program. Information regarding [facility]’s financial assistance program can be found by going to [email or web address] or by calling [phone].

## 2025-03-04 NOTE — PROGRESS NOTE ADULT - SUBJECTIVE AND OBJECTIVE BOX
ED Presentation    HPI:  Patient is a 44 year old male with PMH of recently diagnosed DM presenting for medical evaluation. Patient was sent in by his PMD for reports of uncontrolled diabetes, associated with unintentional weight loss. Patient denies chest pain, fevers, nausea, vomiting, urinary symptoms, or additional complaints  In the ED patient HD stable, glucose 610, B-hydroxy 2.9, VBG: pH 7.25 (27 Feb 2025 19:27)       Today is hospital day 5d.     PAST MEDICAL & SURGICAL HISTORY  Diabetes    No significant past surgical history        SOCIAL HISTORY:  Social History:      Otherwise, as noted in HPI    ALLERGIES:  No Known Allergies      MEDICATIONS:  STANDING MEDICATIONS  enoxaparin Injectable 40 milliGRAM(s) SubCutaneous every 24 hours  gabapentin 100 milliGRAM(s) Oral daily  insulin glargine Injectable (LANTUS) 20 Unit(s) SubCutaneous every morning  insulin lispro (ADMELOG) corrective regimen sliding scale   SubCutaneous three times a day before meals  insulin lispro Injectable (ADMELOG) 7 Unit(s) SubCutaneous three times a day before meals  lactated ringers. 1000 milliLiter(s) IV Continuous <Continuous>    PRN MEDICATIONS      VITALS:   T(C): 36.1 (03-04-25 @ 05:09), Max: 36.8 (03-03-25 @ 12:50)  HR: 77 (03-04-25 @ 05:09) (73 - 85)  BP: 115/72 (03-04-25 @ 05:09) (101/63 - 115/72)  RR: 18 (03-04-25 @ 05:09) (18 - 18)  SpO2: 97% (03-04-25 @ 09:36) (97% - 99%)  I&O's Summary        PHYSICAL EXAM:  GENERAL: Nondistressed  HEAD: Atraumatic  EYES: Conjunctiva and sclera clear  LUNG: Decreased BS  HEART: S1 S2 heard  ABDOMEN: Soft, Nontender  EXTREMITIES: No edema      LABS:                        9.1    4.14  )-----------( 205      ( 04 Mar 2025 09:03 )             28.8     03-04    138  |  103  |  21[H]  ----------------------------<  258[H]  4.2   |  25  |  1.1    Ca    8.6      04 Mar 2025 09:03  Phos  4.1     03-03  Mg     1.8     03-03    TPro  5.4[L]  /  Alb  3.8  /  TBili  <0.2  /  DBili  x   /  AST  99[H]  /  ALT  89[H]  /  AlkPhos  105  03-04      Urinalysis Basic - ( 04 Mar 2025 09:03 )    Color: x / Appearance: x / SG: x / pH: x  Gluc: 258 mg/dL / Ketone: x  / Bili: x / Urobili: x   Blood: x / Protein: x / Nitrite: x   Leuk Esterase: x / RBC: x / WBC x   Sq Epi: x / Non Sq Epi: x / Bacteria: x                
MEDICINE ATTENDING PROGRESS NOTE  HPI:  Patient is a 44 year old male with PMH of recently diagnosed DM presenting for medical evaluation. Patient was sent in by his PMD for reports of uncontrolled diabetes, associated with unintentional weight loss. Patient denies chest pain, fevers, nausea, vomiting, urinary symptoms, or additional complaints  In the ED patient HD stable, glucose 610, B-hydroxy 2.9, VBG: pH 7.25 (27 Feb 2025 19:27)      Interval/Overnight Events      ROS  General: Denies fevers, chills, nightsweats, weight loss  HEENT: Denies headache, rhinorrhea, sore throat, eye pain  CV: Denies CP, palpitations  PULM: Denies SOB, cough  GI: Denies abdominal pain, diarrhea  : Denies dysuria, hematuria  MSK: Denies arthralgias  SKIN: Denies rash   NEURO: Denies paresthesias, weakness  PSYCH: Denies depression    MEDICATIONS  (STANDING):  enoxaparin Injectable 40 milliGRAM(s) SubCutaneous every 24 hours  insulin glargine Injectable (LANTUS) 20 Unit(s) SubCutaneous every morning  insulin lispro (ADMELOG) corrective regimen sliding scale   SubCutaneous three times a day before meals  insulin lispro Injectable (ADMELOG) 7 Unit(s) SubCutaneous three times a day before meals  lactated ringers. 1000 milliLiter(s) (75 mL/Hr) IV Continuous <Continuous>    MEDICATIONS  (PRN):    ANTIBIOTICS:      VITALS:  T(F): 97.8, Max: 97.8 (03-01-25 @ 14:30)  HR: 75  BP: 103/63  RR: 18Vital Signs Last 24 Hrs  T(C): 36.6 (01 Mar 2025 14:30), Max: 36.6 (01 Mar 2025 14:30)  T(F): 97.8 (01 Mar 2025 14:30), Max: 97.8 (01 Mar 2025 14:30)  HR: 75 (01 Mar 2025 14:31) (65 - 76)  BP: 103/63 (01 Mar 2025 14:31) (86/52 - 142/84)  BP(mean): 64 (01 Mar 2025 14:30) (64 - 64)  RR: 18 (01 Mar 2025 14:31) (17 - 18)  SpO2: 98% (01 Mar 2025 14:31) (98% - 98%)    Parameters below as of 01 Mar 2025 14:31  Patient On (Oxygen Delivery Method): room air     I&O's Summary    28 Feb 2025 07:01  -  01 Mar 2025 07:00  --------------------------------------------------------  IN: 0 mL / OUT: 1300 mL / NET: -1300 mL      PHYSICAL EXAM:  Gen: NAD, resting in bed  HEENT: Normocephalic, atraumatic  Neck: supple, no lymphadenopathy  CV: Regular rate & regular rhythm  Lungs: CTABL no wheeze  Abdomen: Soft, NTND+ BS present  Ext: Warm, well perfused no CCE  Neuro: non focal, awake, CN II-XII intact   Skin: no rash, no erythema  Psych: no SI, HI, Hallucination     LABS:                        9.3    4.92  )-----------( 192      ( 28 Feb 2025 17:01 )             28.7     02-28    137  |  102  |  24[H]  ----------------------------<  118[H]  4.1   |  29  |  1.1    Ca    8.9      28 Feb 2025 17:01  Phos  3.2     02-27  Mg     2.1     02-28    TPro  5.6[L]  /  Alb  4.0  /  TBili  <0.2  /  DBili  x   /  AST  44[H]  /  ALT  70[H]  /  AlkPhos  106  02-28      LIVER FUNCTIONS - ( 28 Feb 2025 17:01 )  Alb: 4.0 g/dL / Pro: 5.6 g/dL / ALK PHOS: 106 U/L / ALT: 70 U/L / AST: 44 U/L / GGT: x               MICROBIOLOGY:  Urinalysis Basic - ( 28 Feb 2025 17:01 )    Color: x / Appearance: x / SG: x / pH: x  Gluc: 118 mg/dL / Ketone: x  / Bili: x / Urobili: x   Blood: x / Protein: x / Nitrite: x   Leuk Esterase: x / RBC: x / WBC x   Sq Epi: x / Non Sq Epi: x / Bacteria: x          Blood Gas Venous - Lactate: 1.0 mmol/L (02-27-25 @ 18:36)  Blood Gas Venous - Lactate: 2.4 mmol/L (02-27-25 @ 14:02)        IMAGING:  CXR  Xray Chest 1 View- PORTABLE-Urgent:   ACC: [id number] EXAM:  XR CHEST PORTABLE URGENT 1V   ORDERED BY: MATHEW RASHEED     PROCEDURE DATE:  02/27/2025          INTERPRETATION:  CLINICAL HISTORY: Chest pain.    COMPARISON: Chest radiograph dated 1/1/2020.    TECHNIQUE: Portable frontal chest radiograph.    FINDINGS:    Support devices: None.    Cardiac/mediastinum/hilum: Unremarkable.    Lung parenchyma/Pleura: No focal parenchymal opacities, pleural   effusions, or pneumothorax.    Skeleton/soft tissues: Unremarkable.      IMPRESSION:    No radiographic evidence of acute cardiopulmonary disease.    --- End of Report ---          UMANG GALINDO MD; Resident Radiologist  This document has been electronically signed.  KAILA VICTORIA MD; Attending Radiologist  This document has been electronically signed. Feb 27 2025  9:15PM (02-27-25 @ 13:41)      CT  CT Abdomen and Pelvis w/ IV Cont:   ACC: [id number] EXAM:  CT ABDOMEN AND PELVIS IC   ORDERED BY: MATHEW RASHEED     PROCEDURE DATE:  02/27/2025          INTERPRETATION:  CLINICAL STATEMENT: Abdominal pain and jaundice.    TECHNIQUE: Contiguous axial CT images were obtained from thelower chest   to the pubic symphysis following administration of intravenous contrast.    95 cc administered of Omnipaque 350 (5 cc discarded).  Oral contrast was   not administered.  Reformatted images in the coronal and sagittal planes   were acquired.    COMPARISON CT: January 1, 2020    OTHER STUDIES USED FOR CORRELATION: None.      FINDINGS:    LOWER CHEST: Bibasilar subsegmental dependent atelectasis. Left lower   lobe 2 mm subpleural nodule (5/53).    HEPATOBILIARY: Hepatic steatosis.    SPLEEN: Unremarkable.    PANCREAS: Unremarkable.    ADRENAL GLANDS: Unremarkable.    KIDNEYS: Symmetric renal enhancement bilaterally. No hydronephrosis.    ABDOMINOPELVIC NODES: No lymphadenopathy.    PELVIC ORGANS: Mild prostatomegaly.    PERITONEUM/MESENTERY/BOWEL: Large diffuse colonic burden. No bowel   obstruction, ascites or pneumoperitoneum. Normal appendix.    BONES/SOFT TISSUES: Avascular necrosis of the right femoral head without   subchondral collapse. Bilateral L5 pars defects without significant   spondylolisthesis.      IMPRESSION:  1.  No CT evidence of an acute abdominopelvic pathology.  2.  Large diffuse colonic stool burden.    --- End of Report ---            JACQUELYN EM MD; Attending Radiologist  This document has been electronically signed. Feb 27 2025  5:04PM (02-27-25 @ 16:20)    CARDIOLOGY TESTING  QRS axis to [] ° and NSR at a rate of [] BPM. There was no atrial enlargement. There was no ventricular hypertrophy. There were no ST-T changes and all intervals were normal.    12 Lead ECG:   Ventricular Rate 72 BPM    Atrial Rate 72 BPM    P-R Interval 152 ms    QRS Duration 78 ms    Q-T Interval 430 ms    QTC Calculation(Bazett) 470 ms    P Axis 88 degrees    R Axis 94 degrees    T Axis 3 degrees    Diagnosis Line Normal sinus rhythm  Rightward axis  Biventricular hypertrophy  Cannot rule out Inferior infarct , age undetermined  Abnormal ECG    Confirmed by Pranay Scott ([id number]) on 2/27/2025 6:31:12 PM (02-27-25 @ 14:14)  12 Lead ECG:   Ventricular Rate 78 BPM    Atrial Rate 78 BPM    P-R Interval 160 ms    QRS Duration 78 ms    Q-T Interval 410 ms    QTC Calculation(Bazett) 467 ms    P Axis 74 degrees    R Axis 57 degrees    T Axis 32 degrees    Diagnosis Line Normal sinus rhythm  Possible Inferior infarct , age undetermined  Abnormal ECG    Confirmed by Pranay Scott ([id number]) on 2/27/2025 6:31:08 PM (02-27-25 @ 13:05)      ASSESSMENT/PLAN:  Patient is a 44 year old male with PMH of recently diagnosed DM admitted to the ICU for DKA    #DKA resolved  #DM  - s/p Inusilin drip  - Gap closed, switched to SQ Insulin  - Monitor FS   - Endo consulted    #Transaminitis  - LFTs elevated on admission  - CT AP and RUQ US unremarkable  - Trend LFTs  - Avoid hepatotoxic meds    #Supportive Management:  Dispo:  Home vs SNF  DVT Ppx: enoxaparin Injectable 40 milliGRAM(s) SubCutaneous every 24 hours  GI Ppx: Diet:  Diet, Consistent Carbohydrate/No Snacks (02-27-25 @ 20:01) [Active]      Total time spent to complete patient's bedside assessment, review medical chart, discuss medical plan of care with covering medical team was more than 45 minutes with >50% of time spent face to face with patient, discussion with patient/family and/or coordination of care    Housestaff's notes reviewed. When there is confusion regarding the content or information provided in the notes of a housestaff (resident, medical student, physician assistant, or nurse practioner) and the attending physician notes, the attending physician's note takes precedence and supersedes the other notes.    Danny Caldera MD/Dannielle  Attending Physician
SUBJECTIVE/OVERNIGHT EVENTS  Today is hospital day 3d. This morning patient was seen and examined at bedside, resting comfortably in bed. No acute or major events overnight.      MEDICATIONS  STANDING MEDICATIONS  enoxaparin Injectable 40 milliGRAM(s) SubCutaneous every 24 hours  insulin glargine Injectable (LANTUS) 20 Unit(s) SubCutaneous every morning  insulin lispro (ADMELOG) corrective regimen sliding scale   SubCutaneous three times a day before meals  insulin lispro Injectable (ADMELOG) 7 Unit(s) SubCutaneous three times a day before meals  lactated ringers. 1000 milliLiter(s) IV Continuous <Continuous>    PRN MEDICATIONS    VITALS  T(F): 97.5 (03-01-25 @ 21:30), Max: 97.8 (03-01-25 @ 14:30)  HR: 72 (03-01-25 @ 21:30) (65 - 76)  BP: 113/67 (03-01-25 @ 21:30) (86/52 - 142/84)  RR: 18 (03-01-25 @ 21:30) (17 - 18)  SpO2: 97% (03-01-25 @ 21:30) (97% - 98%)  POCT Blood Glucose.: 222 mg/dL (03-01-25 @ 22:01)  POCT Blood Glucose.: 116 mg/dL (03-01-25 @ 17:31)  POCT Blood Glucose.: 134 mg/dL (03-01-25 @ 12:33)  POCT Blood Glucose.: 230 mg/dL (03-01-25 @ 08:22)    PHYSICAL EXAM  GENERAL  NAD    LABS             9.3    4.92  )-----------( 192      ( 02-28-25 @ 17:01 )             28.7     137  |  102  |  24  -------------------------<  118   02-28-25 @ 17:01  4.1  |  29  |  1.1    Ca      8.9     02-28-25 @ 17:01  Mg     2.1     02-28-25 @ 17:01    TPro  5.6  /  Alb  4.0  /  TBili  <0.2  /  DBili  x   /  AST  44  /  ALT  70  /  AlkPhos  106  /  GGT  x     02-28-25 @ 17:01      Troponin T, High Sensitivity Result: 21 ng/L (02-27-25 @ 13:41)    Urinalysis Basic - ( 28 Feb 2025 17:01 )    Color: x / Appearance: x / SG: x / pH: x  Gluc: 118 mg/dL / Ketone: x  / Bili: x / Urobili: x   Blood: x / Protein: x / Nitrite: x   Leuk Esterase: x / RBC: x / WBC x   Sq Epi: x / Non Sq Epi: x / Bacteria: x          IMAGING

## 2025-03-04 NOTE — DISCHARGE NOTE NURSING/CASE MANAGEMENT/SOCIAL WORK - PATIENT PORTAL LINK FT
You can access the FollowMyHealth Patient Portal offered by [facility] by registering at the following website: [email or web address]. By joining [employer]’s FollowMyHealth portal, you will also be able to view your health information using other applications (apps) compatible with our system.

## 2025-03-05 LAB — CORTIS SERPL-MCNC: 13.7 UG/DL — SIGNIFICANT CHANGE UP

## 2025-03-11 DIAGNOSIS — Z79.84 LONG TERM (CURRENT) USE OF ORAL HYPOGLYCEMIC DRUGS: ICD-10-CM

## 2025-03-11 DIAGNOSIS — R74.01 ELEVATION OF LEVELS OF LIVER TRANSAMINASE LEVELS: ICD-10-CM

## 2025-03-11 DIAGNOSIS — E11.10 TYPE 2 DIABETES MELLITUS WITH KETOACIDOSIS WITHOUT COMA: ICD-10-CM

## 2025-03-11 DIAGNOSIS — R17 UNSPECIFIED JAUNDICE: ICD-10-CM

## 2025-03-11 DIAGNOSIS — M25.511 PAIN IN RIGHT SHOULDER: ICD-10-CM

## 2025-03-11 DIAGNOSIS — R79.89 OTHER SPECIFIED ABNORMAL FINDINGS OF BLOOD CHEMISTRY: ICD-10-CM

## 2025-03-11 DIAGNOSIS — M25.512 PAIN IN LEFT SHOULDER: ICD-10-CM

## 2025-03-11 DIAGNOSIS — I95.9 HYPOTENSION, UNSPECIFIED: ICD-10-CM

## 2025-03-11 DIAGNOSIS — M54.2 CERVICALGIA: ICD-10-CM

## 2025-03-11 DIAGNOSIS — E86.0 DEHYDRATION: ICD-10-CM

## 2025-03-13 ENCOUNTER — APPOINTMENT (OUTPATIENT)
Dept: ENDOCRINOLOGY | Facility: CLINIC | Age: 45
End: 2025-03-13